# Patient Record
Sex: FEMALE | Race: OTHER | HISPANIC OR LATINO | URBAN - METROPOLITAN AREA
[De-identification: names, ages, dates, MRNs, and addresses within clinical notes are randomized per-mention and may not be internally consistent; named-entity substitution may affect disease eponyms.]

---

## 2017-11-08 ENCOUNTER — EMERGENCY (EMERGENCY)
Facility: HOSPITAL | Age: 32
LOS: 1 days | Discharge: HOME | End: 2017-11-08
Attending: EMERGENCY MEDICINE | Admitting: EMERGENCY MEDICINE
Payer: MEDICAID

## 2017-11-08 VITALS
DIASTOLIC BLOOD PRESSURE: 89 MMHG | OXYGEN SATURATION: 100 % | SYSTOLIC BLOOD PRESSURE: 142 MMHG | HEIGHT: 66 IN | WEIGHT: 259.93 LBS | TEMPERATURE: 98 F | HEART RATE: 95 BPM | RESPIRATION RATE: 16 BRPM

## 2017-11-08 DIAGNOSIS — Z98.89 OTHER SPECIFIED POSTPROCEDURAL STATES: Chronic | ICD-10-CM

## 2017-11-08 DIAGNOSIS — Z98.84 BARIATRIC SURGERY STATUS: Chronic | ICD-10-CM

## 2017-11-08 LAB
ALBUMIN SERPL ELPH-MCNC: 4.7 G/DL — SIGNIFICANT CHANGE UP (ref 3.3–5.2)
ALP SERPL-CCNC: 47 U/L — SIGNIFICANT CHANGE UP (ref 40–120)
ALT FLD-CCNC: 54 U/L — HIGH
ANION GAP SERPL CALC-SCNC: 18 MMOL/L — HIGH (ref 5–17)
APPEARANCE UR: CLEAR — SIGNIFICANT CHANGE UP
AST SERPL-CCNC: 46 U/L — HIGH
BACTERIA # UR AUTO: NEGATIVE — SIGNIFICANT CHANGE UP
BASOPHILS # BLD AUTO: 0 K/UL — SIGNIFICANT CHANGE UP (ref 0–0.2)
BASOPHILS NFR BLD AUTO: 0.1 % — SIGNIFICANT CHANGE UP (ref 0–2)
BILIRUB SERPL-MCNC: 0.3 MG/DL — LOW (ref 0.4–2)
BILIRUB UR-MCNC: NEGATIVE — SIGNIFICANT CHANGE UP
BUN SERPL-MCNC: 8 MG/DL — SIGNIFICANT CHANGE UP (ref 8–20)
CALCIUM SERPL-MCNC: 9.5 MG/DL — SIGNIFICANT CHANGE UP (ref 8.6–10.2)
CHLORIDE SERPL-SCNC: 99 MMOL/L — SIGNIFICANT CHANGE UP (ref 98–107)
CO2 SERPL-SCNC: 23 MMOL/L — SIGNIFICANT CHANGE UP (ref 22–29)
COLOR SPEC: YELLOW — SIGNIFICANT CHANGE UP
CREAT SERPL-MCNC: 0.45 MG/DL — LOW (ref 0.5–1.3)
DIFF PNL FLD: ABNORMAL
EOSINOPHIL # BLD AUTO: 0 K/UL — SIGNIFICANT CHANGE UP (ref 0–0.5)
EOSINOPHIL NFR BLD AUTO: 0 % — SIGNIFICANT CHANGE UP (ref 0–6)
EPI CELLS # UR: SIGNIFICANT CHANGE UP
GLUCOSE SERPL-MCNC: 106 MG/DL — SIGNIFICANT CHANGE UP (ref 70–115)
GLUCOSE UR QL: NEGATIVE MG/DL — SIGNIFICANT CHANGE UP
HCG SERPL-ACNC: <2 MIU/ML — SIGNIFICANT CHANGE UP
HCT VFR BLD CALC: 41.2 % — SIGNIFICANT CHANGE UP (ref 37–47)
HGB BLD-MCNC: 13.8 G/DL — SIGNIFICANT CHANGE UP (ref 12–16)
KETONES UR-MCNC: ABNORMAL
LEUKOCYTE ESTERASE UR-ACNC: ABNORMAL
LIDOCAIN IGE QN: 25 U/L — SIGNIFICANT CHANGE UP (ref 22–51)
LYMPHOCYTES # BLD AUTO: 1.3 K/UL — SIGNIFICANT CHANGE UP (ref 1–4.8)
LYMPHOCYTES # BLD AUTO: 14 % — LOW (ref 20–55)
MCHC RBC-ENTMCNC: 29.6 PG — SIGNIFICANT CHANGE UP (ref 27–31)
MCHC RBC-ENTMCNC: 33.5 G/DL — SIGNIFICANT CHANGE UP (ref 32–36)
MCV RBC AUTO: 88.4 FL — SIGNIFICANT CHANGE UP (ref 81–99)
MONOCYTES # BLD AUTO: 0.2 K/UL — SIGNIFICANT CHANGE UP (ref 0–0.8)
MONOCYTES NFR BLD AUTO: 2.6 % — LOW (ref 3–10)
NEUTROPHILS # BLD AUTO: 7.6 K/UL — SIGNIFICANT CHANGE UP (ref 1.8–8)
NEUTROPHILS NFR BLD AUTO: 83.1 % — HIGH (ref 37–73)
NITRITE UR-MCNC: NEGATIVE — SIGNIFICANT CHANGE UP
PH UR: 6 — SIGNIFICANT CHANGE UP (ref 5–8)
PLATELET # BLD AUTO: 291 K/UL — SIGNIFICANT CHANGE UP (ref 150–400)
POTASSIUM SERPL-MCNC: 3.5 MMOL/L — SIGNIFICANT CHANGE UP (ref 3.5–5.3)
POTASSIUM SERPL-SCNC: 3.5 MMOL/L — SIGNIFICANT CHANGE UP (ref 3.5–5.3)
PROT SERPL-MCNC: 7.8 G/DL — SIGNIFICANT CHANGE UP (ref 6.6–8.7)
PROT UR-MCNC: NEGATIVE MG/DL — SIGNIFICANT CHANGE UP
RBC # BLD: 4.66 M/UL — SIGNIFICANT CHANGE UP (ref 4.4–5.2)
RBC # FLD: 12.5 % — SIGNIFICANT CHANGE UP (ref 11–15.6)
RBC CASTS # UR COMP ASSIST: SIGNIFICANT CHANGE UP /HPF (ref 0–4)
SODIUM SERPL-SCNC: 140 MMOL/L — SIGNIFICANT CHANGE UP (ref 135–145)
SP GR SPEC: 1.01 — SIGNIFICANT CHANGE UP (ref 1.01–1.02)
UROBILINOGEN FLD QL: NEGATIVE MG/DL — SIGNIFICANT CHANGE UP
WBC # BLD: 9.2 K/UL — SIGNIFICANT CHANGE UP (ref 4.8–10.8)
WBC # FLD AUTO: 9.2 K/UL — SIGNIFICANT CHANGE UP (ref 4.8–10.8)
WBC UR QL: SIGNIFICANT CHANGE UP

## 2017-11-08 PROCEDURE — 96374 THER/PROPH/DIAG INJ IV PUSH: CPT | Mod: XU

## 2017-11-08 PROCEDURE — 83690 ASSAY OF LIPASE: CPT

## 2017-11-08 PROCEDURE — 80053 COMPREHEN METABOLIC PANEL: CPT

## 2017-11-08 PROCEDURE — 36415 COLL VENOUS BLD VENIPUNCTURE: CPT

## 2017-11-08 PROCEDURE — 99284 EMERGENCY DEPT VISIT MOD MDM: CPT | Mod: 25

## 2017-11-08 PROCEDURE — 74177 CT ABD & PELVIS W/CONTRAST: CPT

## 2017-11-08 PROCEDURE — 96375 TX/PRO/DX INJ NEW DRUG ADDON: CPT

## 2017-11-08 PROCEDURE — 85027 COMPLETE CBC AUTOMATED: CPT

## 2017-11-08 PROCEDURE — 96376 TX/PRO/DX INJ SAME DRUG ADON: CPT

## 2017-11-08 PROCEDURE — 74177 CT ABD & PELVIS W/CONTRAST: CPT | Mod: 26

## 2017-11-08 PROCEDURE — 84702 CHORIONIC GONADOTROPIN TEST: CPT

## 2017-11-08 PROCEDURE — 81001 URINALYSIS AUTO W/SCOPE: CPT

## 2017-11-08 PROCEDURE — 99285 EMERGENCY DEPT VISIT HI MDM: CPT

## 2017-11-08 RX ORDER — MORPHINE SULFATE 50 MG/1
2 CAPSULE, EXTENDED RELEASE ORAL ONCE
Qty: 0 | Refills: 0 | Status: DISCONTINUED | OUTPATIENT
Start: 2017-11-08 | End: 2017-11-08

## 2017-11-08 RX ORDER — SUCRALFATE 1 G
1 TABLET ORAL
Qty: 60 | Refills: 0
Start: 2017-11-08 | End: 2017-11-23

## 2017-11-08 RX ORDER — PANTOPRAZOLE SODIUM 20 MG/1
1 TABLET, DELAYED RELEASE ORAL
Qty: 15 | Refills: 0
Start: 2017-11-08 | End: 2017-11-23

## 2017-11-08 RX ORDER — SODIUM CHLORIDE 9 MG/ML
1000 INJECTION INTRAMUSCULAR; INTRAVENOUS; SUBCUTANEOUS ONCE
Qty: 0 | Refills: 0 | Status: COMPLETED | OUTPATIENT
Start: 2017-11-08 | End: 2017-11-08

## 2017-11-08 RX ORDER — ONDANSETRON 8 MG/1
1 TABLET, FILM COATED ORAL
Qty: 20 | Refills: 0
Start: 2017-11-08 | End: 2017-11-13

## 2017-11-08 RX ORDER — ONDANSETRON 8 MG/1
4 TABLET, FILM COATED ORAL ONCE
Qty: 0 | Refills: 0 | Status: COMPLETED | OUTPATIENT
Start: 2017-11-08 | End: 2017-11-08

## 2017-11-08 RX ORDER — SUCRALFATE 1 G
1 TABLET ORAL ONCE
Qty: 0 | Refills: 0 | Status: COMPLETED | OUTPATIENT
Start: 2017-11-08 | End: 2017-11-08

## 2017-11-08 RX ORDER — PANTOPRAZOLE SODIUM 20 MG/1
40 TABLET, DELAYED RELEASE ORAL ONCE
Qty: 0 | Refills: 0 | Status: COMPLETED | OUTPATIENT
Start: 2017-11-08 | End: 2017-11-08

## 2017-11-08 RX ORDER — METOCLOPRAMIDE HCL 10 MG
10 TABLET ORAL ONCE
Qty: 0 | Refills: 0 | Status: COMPLETED | OUTPATIENT
Start: 2017-11-08 | End: 2017-11-08

## 2017-11-08 RX ORDER — FAMOTIDINE 10 MG/ML
20 INJECTION INTRAVENOUS ONCE
Qty: 0 | Refills: 0 | Status: COMPLETED | OUTPATIENT
Start: 2017-11-08 | End: 2017-11-08

## 2017-11-08 RX ADMIN — ONDANSETRON 4 MILLIGRAM(S): 8 TABLET, FILM COATED ORAL at 17:47

## 2017-11-08 RX ADMIN — Medication 10 MILLIGRAM(S): at 18:09

## 2017-11-08 RX ADMIN — MORPHINE SULFATE 2 MILLIGRAM(S): 50 CAPSULE, EXTENDED RELEASE ORAL at 18:04

## 2017-11-08 RX ADMIN — ONDANSETRON 4 MILLIGRAM(S): 8 TABLET, FILM COATED ORAL at 21:36

## 2017-11-08 RX ADMIN — Medication 1 GRAM(S): at 17:01

## 2017-11-08 RX ADMIN — SODIUM CHLORIDE 1000 MILLILITER(S): 9 INJECTION INTRAMUSCULAR; INTRAVENOUS; SUBCUTANEOUS at 16:59

## 2017-11-08 RX ADMIN — FAMOTIDINE 20 MILLIGRAM(S): 10 INJECTION INTRAVENOUS at 16:59

## 2017-11-08 RX ADMIN — PANTOPRAZOLE SODIUM 40 MILLIGRAM(S): 20 TABLET, DELAYED RELEASE ORAL at 21:36

## 2017-11-08 RX ADMIN — ONDANSETRON 4 MILLIGRAM(S): 8 TABLET, FILM COATED ORAL at 16:59

## 2017-11-08 RX ADMIN — SODIUM CHLORIDE 2000 MILLILITER(S): 9 INJECTION INTRAMUSCULAR; INTRAVENOUS; SUBCUTANEOUS at 18:08

## 2017-11-08 RX ADMIN — MORPHINE SULFATE 2 MILLIGRAM(S): 50 CAPSULE, EXTENDED RELEASE ORAL at 17:47

## 2017-11-08 NOTE — ED ADULT TRIAGE NOTE - CHIEF COMPLAINT QUOTE
pt presents to ED With epigastric pain since today s/p eating today. pt statse she has gastric sleeve 10/23/17. pt c.o n/v,

## 2017-11-08 NOTE — ED STATDOCS - MEDICAL DECISION MAKING DETAILS
I, Meagan Dominguez, performed the initial face to face bedside interview with this patient regarding history of present illness and determined that the patient should be seen in the main ED.  The history, was documented by the scribe in my presence and I attest to the accuracy of the documentation.

## 2017-11-08 NOTE — ED PROVIDER NOTE - NS ED ATTENDING STATEMENT MOD
Dr. Leon I have personally seen and examined this patient.  I have fully participated in the care of this patient. I have reviewed all pertinent clinical information, including history, physical exam, plan and the Resident’s note and agree except as noted.

## 2017-11-08 NOTE — ED ADULT NURSE REASSESSMENT NOTE - NS ED NURSE REASSESS COMMENT FT1
pt medicated again for nausea ,pt sitting up in stretcher, ambulatory to bathroom when needed without assistance. IV site patent, NS infusing, PO contrast given, awaiting CT.

## 2017-11-08 NOTE — ED PROVIDER NOTE - ATTENDING CONTRIBUTION TO CARE
I personally saw the patient with the resident, and completed the key components of the history and physical exam. I then discussed the management plan with the resident. I agree with the resident's notes as documented in the medical record. I agree with the management plan as reflected in the orders.

## 2017-11-08 NOTE — ED ADULT NURSE NOTE - OBJECTIVE STATEMENT
pt ambulatory into Ed with reports of epigastric pain s.p eating. pt with reports of burning and nausea/vomiting as well. pt is AOX3, denies fevers or diarrhea. even and unlabored resps, no urinary complaints, states hx gastric sleeve.

## 2017-11-08 NOTE — ED PROVIDER NOTE - OBJECTIVE STATEMENT
32 year-old female s/p gastric sleeve on 10/24/17, presents with abdominal pain, nausea and vomiting since eating breakfast this morning. She states that she had been dong well postoperative until this morning, when she developed epigastric abdominal pain after eating eggs. Pain is 10/10, non-radiating, with associated nausea and vomiting. She noted a small amount of bright red blood on toilet paper while wiping.

## 2017-11-08 NOTE — ED PROVIDER NOTE - MEDICAL DECISION MAKING DETAILS
Patient with recent bariatric surgery, now with abdominal pain, N/V.  Will obtain basic labs and abdominal CT to evaluate for leak.

## 2017-11-08 NOTE — ED ADULT NURSE REASSESSMENT NOTE - NS ED NURSE REASSESS COMMENT FT1
pt tolerated PO, drank some chicken broth. no vomiting since arrival and medications. IV patent, ER MD aware. pt advised of her normal CT and lab results. urine sent at this time to lab

## 2017-11-08 NOTE — ED PROVIDER NOTE - PROGRESS NOTE DETAILS
Ct results as noted.  Pt with pain after po challenge.  Case d/w Dr. Bonilla form [facility] [phone] and agrees with d/c on Carafate and Protonix and will arrange for f/u in the office tomorrow-pt understands and needs to call at 9am for appt time.  Pt understands to return for increased pain, vomiting or any other problems

## 2017-11-08 NOTE — ED STATDOCS - PROGRESS NOTE DETAILS
31 y/o F pt presents to the ED with c/o nausea associated with bloody stool and abd pain which onset this morning after she ate scrambled eggs. PShx of gastric sleeve at [facility] on oct 24th. Pt was given oxycodone but couldn't keep the medicine down due to vomiting.  Denies fever. Pt has been frequently passing gas all day. Pt is an occasional drinker. Pt has seen the Surgeon s/p the procedure but she was fine. Will transfer to the main ED.   Surgeon: Dr. Chan 33 y/o F pt presents to the ED with c/o nausea associated with bloody stool and abd pain which onset this morning after she ate scrambled eggs. PShx of gastric sleeve at [facility] on oct 24th. Pt was given oxycodone but couldn't keep the medicine down due to vomiting.  Denies fever. Pt has been frequently passing gas all day. Pt states that she drank 3-4 shots of liquor about 3 days ago and developed nausea/vomiting the day after but then recovered. Abdomen soft, with tenderness across epigastrium but no guarding /rebound. Pt has seen the Surgeon s/p the procedure but she was fine. Will transfer to the main ED.   Surgeon: Dr. Chan

## 2018-02-18 ENCOUNTER — EMERGENCY (EMERGENCY)
Facility: HOSPITAL | Age: 33
LOS: 1 days | Discharge: HOME | End: 2018-02-18
Attending: EMERGENCY MEDICINE | Admitting: EMERGENCY MEDICINE
Payer: COMMERCIAL

## 2018-02-18 VITALS — WEIGHT: 220.02 LBS | HEIGHT: 63 IN

## 2018-02-18 VITALS
TEMPERATURE: 98 F | OXYGEN SATURATION: 98 % | SYSTOLIC BLOOD PRESSURE: 158 MMHG | RESPIRATION RATE: 16 BRPM | DIASTOLIC BLOOD PRESSURE: 116 MMHG | HEART RATE: 92 BPM

## 2018-02-18 DIAGNOSIS — Z98.84 BARIATRIC SURGERY STATUS: Chronic | ICD-10-CM

## 2018-02-18 DIAGNOSIS — Z98.89 OTHER SPECIFIED POSTPROCEDURAL STATES: Chronic | ICD-10-CM

## 2018-02-18 LAB
ALBUMIN SERPL ELPH-MCNC: 4.5 G/DL — SIGNIFICANT CHANGE UP (ref 3.3–5.2)
ALP SERPL-CCNC: 50 U/L — SIGNIFICANT CHANGE UP (ref 40–120)
ALT FLD-CCNC: 15 U/L — SIGNIFICANT CHANGE UP
ANION GAP SERPL CALC-SCNC: 18 MMOL/L — HIGH (ref 5–17)
APPEARANCE UR: ABNORMAL
AST SERPL-CCNC: 20 U/L — SIGNIFICANT CHANGE UP
BACTERIA # UR AUTO: ABNORMAL
BILIRUB SERPL-MCNC: <0.2 MG/DL — LOW (ref 0.4–2)
BILIRUB UR-MCNC: NEGATIVE — SIGNIFICANT CHANGE UP
BUN SERPL-MCNC: 6 MG/DL — LOW (ref 8–20)
CALCIUM SERPL-MCNC: 9.8 MG/DL — SIGNIFICANT CHANGE UP (ref 8.6–10.2)
CHLORIDE SERPL-SCNC: 100 MMOL/L — SIGNIFICANT CHANGE UP (ref 98–107)
CO2 SERPL-SCNC: 24 MMOL/L — SIGNIFICANT CHANGE UP (ref 22–29)
COLOR SPEC: YELLOW — SIGNIFICANT CHANGE UP
CREAT SERPL-MCNC: 0.52 MG/DL — SIGNIFICANT CHANGE UP (ref 0.5–1.3)
DIFF PNL FLD: ABNORMAL
EPI CELLS # UR: ABNORMAL
GLUCOSE SERPL-MCNC: 104 MG/DL — SIGNIFICANT CHANGE UP (ref 70–115)
GLUCOSE UR QL: NEGATIVE MG/DL — SIGNIFICANT CHANGE UP
HCG UR QL: NEGATIVE — SIGNIFICANT CHANGE UP
HCT VFR BLD CALC: 46.6 % — SIGNIFICANT CHANGE UP (ref 37–47)
HGB BLD-MCNC: 15.6 G/DL — SIGNIFICANT CHANGE UP (ref 12–16)
KETONES UR-MCNC: ABNORMAL
LEUKOCYTE ESTERASE UR-ACNC: ABNORMAL
LIDOCAIN IGE QN: 11 U/L — LOW (ref 22–51)
MCHC RBC-ENTMCNC: 31 PG — SIGNIFICANT CHANGE UP (ref 27–31)
MCHC RBC-ENTMCNC: 33.5 G/DL — SIGNIFICANT CHANGE UP (ref 32–36)
MCV RBC AUTO: 92.6 FL — SIGNIFICANT CHANGE UP (ref 81–99)
NITRITE UR-MCNC: NEGATIVE — SIGNIFICANT CHANGE UP
PH UR: 6 — SIGNIFICANT CHANGE UP (ref 5–8)
PLATELET # BLD AUTO: 280 K/UL — SIGNIFICANT CHANGE UP (ref 150–400)
POTASSIUM SERPL-MCNC: 3.7 MMOL/L — SIGNIFICANT CHANGE UP (ref 3.5–5.3)
POTASSIUM SERPL-SCNC: 3.7 MMOL/L — SIGNIFICANT CHANGE UP (ref 3.5–5.3)
PROT SERPL-MCNC: 7.9 G/DL — SIGNIFICANT CHANGE UP (ref 6.6–8.7)
PROT UR-MCNC: 30 MG/DL
RBC # BLD: 5.03 M/UL — SIGNIFICANT CHANGE UP (ref 4.4–5.2)
RBC # FLD: 12.5 % — SIGNIFICANT CHANGE UP (ref 11–15.6)
RBC CASTS # UR COMP ASSIST: ABNORMAL /HPF (ref 0–4)
SODIUM SERPL-SCNC: 142 MMOL/L — SIGNIFICANT CHANGE UP (ref 135–145)
SP GR SPEC: 1.02 — SIGNIFICANT CHANGE UP (ref 1.01–1.02)
UROBILINOGEN FLD QL: NEGATIVE MG/DL — SIGNIFICANT CHANGE UP
WBC # BLD: 5.8 K/UL — SIGNIFICANT CHANGE UP (ref 4.8–10.8)
WBC # FLD AUTO: 5.8 K/UL — SIGNIFICANT CHANGE UP (ref 4.8–10.8)
WBC UR QL: ABNORMAL

## 2018-02-18 PROCEDURE — 80053 COMPREHEN METABOLIC PANEL: CPT

## 2018-02-18 PROCEDURE — 96375 TX/PRO/DX INJ NEW DRUG ADDON: CPT

## 2018-02-18 PROCEDURE — 36415 COLL VENOUS BLD VENIPUNCTURE: CPT

## 2018-02-18 PROCEDURE — 99284 EMERGENCY DEPT VISIT MOD MDM: CPT | Mod: 25

## 2018-02-18 PROCEDURE — 76700 US EXAM ABDOM COMPLETE: CPT

## 2018-02-18 PROCEDURE — 83690 ASSAY OF LIPASE: CPT

## 2018-02-18 PROCEDURE — 81025 URINE PREGNANCY TEST: CPT

## 2018-02-18 PROCEDURE — 76700 US EXAM ABDOM COMPLETE: CPT | Mod: 26

## 2018-02-18 PROCEDURE — 74019 RADEX ABDOMEN 2 VIEWS: CPT

## 2018-02-18 PROCEDURE — 96374 THER/PROPH/DIAG INJ IV PUSH: CPT

## 2018-02-18 PROCEDURE — 99284 EMERGENCY DEPT VISIT MOD MDM: CPT

## 2018-02-18 PROCEDURE — 74019 RADEX ABDOMEN 2 VIEWS: CPT | Mod: 26

## 2018-02-18 PROCEDURE — 81001 URINALYSIS AUTO W/SCOPE: CPT

## 2018-02-18 PROCEDURE — 85027 COMPLETE CBC AUTOMATED: CPT

## 2018-02-18 RX ORDER — PANTOPRAZOLE SODIUM 20 MG/1
40 TABLET, DELAYED RELEASE ORAL ONCE
Qty: 0 | Refills: 0 | Status: COMPLETED | OUTPATIENT
Start: 2018-02-18 | End: 2018-02-18

## 2018-02-18 RX ORDER — ONDANSETRON 8 MG/1
4 TABLET, FILM COATED ORAL ONCE
Qty: 0 | Refills: 0 | Status: COMPLETED | OUTPATIENT
Start: 2018-02-18 | End: 2018-02-18

## 2018-02-18 RX ORDER — PANTOPRAZOLE SODIUM 20 MG/1
1 TABLET, DELAYED RELEASE ORAL
Qty: 7 | Refills: 0
Start: 2018-02-18 | End: 2018-02-24

## 2018-02-18 RX ORDER — KETOROLAC TROMETHAMINE 30 MG/ML
30 SYRINGE (ML) INJECTION ONCE
Qty: 0 | Refills: 0 | Status: DISCONTINUED | OUTPATIENT
Start: 2018-02-18 | End: 2018-02-18

## 2018-02-18 RX ORDER — SODIUM CHLORIDE 9 MG/ML
1000 INJECTION INTRAMUSCULAR; INTRAVENOUS; SUBCUTANEOUS ONCE
Qty: 0 | Refills: 0 | Status: COMPLETED | OUTPATIENT
Start: 2018-02-18 | End: 2018-02-18

## 2018-02-18 RX ADMIN — SODIUM CHLORIDE 1000 MILLILITER(S): 9 INJECTION INTRAMUSCULAR; INTRAVENOUS; SUBCUTANEOUS at 16:36

## 2018-02-18 RX ADMIN — ONDANSETRON 4 MILLIGRAM(S): 8 TABLET, FILM COATED ORAL at 16:41

## 2018-02-18 RX ADMIN — PANTOPRAZOLE SODIUM 40 MILLIGRAM(S): 20 TABLET, DELAYED RELEASE ORAL at 19:15

## 2018-02-18 RX ADMIN — Medication 30 MILLIGRAM(S): at 19:00

## 2018-02-18 RX ADMIN — Medication 30 MILLIGRAM(S): at 16:36

## 2018-02-18 NOTE — ED PROVIDER NOTE - MEDICAL DECISION MAKING DETAILS
epigastric pain will get labs, abdominal xray and abdominal US. If neg pt will follow up with general surgery and GI

## 2018-02-18 NOTE — ED ADULT NURSE NOTE - OBJECTIVE STATEMENT
Patient states that she had bariatric surgery in october and drank a lot of alcohol on valentines day and is now having abdominal pain. 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 Patient c/o N/V, denies any diarrhea.

## 2018-02-18 NOTE — ED PROVIDER NOTE - ATTENDING CONTRIBUTION TO CARE
I, Melita Brunner, performed the initial face to face bedside interview with this patient regarding history of present illness, review of symptoms and relevant past medical, social and family history.  I completed an independent physical examination.  I was the initial provider who evaluated this patient. I have signed out the follow up of any pending tests (i.e. labs, radiological studies) to the ACP.  I have communicated the patient’s plan of care and disposition with the ACP.

## 2018-02-18 NOTE — ED PROVIDER NOTE - OBJECTIVE STATEMENT
31 y/o f with hx of gastric sleeve in oct presenting with nausea, vomiting and epigastric pain since Thursday after binge drinking on Wednesday night. Denies diarrhea, fever, chills 31 y/o f with hx of gastric sleeve in Oct 2017 presenting with nausea, vomiting and 8/10 epigastric pain since Thursday after binge drinking on Wednesday night. Denies diarrhea, fever, chills

## 2018-02-18 NOTE — ED ADULT TRIAGE NOTE - CHIEF COMPLAINT QUOTE
pt presents to ED with abd pain with nausea/vomiting x 3 days. pt has hx of bariatric surgery and drank alcohol 5 days ago.

## 2018-02-18 NOTE — ED ADULT NURSE NOTE - CHPI ED SYMPTOMS NEG
no hematuria/no burning urination/no chills/no dysuria/no blood in stool/no fever/no abdominal distension/no diarrhea

## 2018-11-21 ENCOUNTER — EMERGENCY (EMERGENCY)
Facility: HOSPITAL | Age: 33
LOS: 1 days | Discharge: HOME | End: 2018-11-21
Attending: EMERGENCY MEDICINE
Payer: COMMERCIAL

## 2018-11-21 VITALS — WEIGHT: 203.93 LBS | HEIGHT: 63 IN

## 2018-11-21 VITALS
TEMPERATURE: 98 F | OXYGEN SATURATION: 99 % | RESPIRATION RATE: 20 BRPM | SYSTOLIC BLOOD PRESSURE: 128 MMHG | HEART RATE: 75 BPM | DIASTOLIC BLOOD PRESSURE: 85 MMHG

## 2018-11-21 DIAGNOSIS — Z98.89 OTHER SPECIFIED POSTPROCEDURAL STATES: Chronic | ICD-10-CM

## 2018-11-21 DIAGNOSIS — Z98.84 BARIATRIC SURGERY STATUS: Chronic | ICD-10-CM

## 2018-11-21 PROCEDURE — 99284 EMERGENCY DEPT VISIT MOD MDM: CPT

## 2018-11-21 PROCEDURE — 99283 EMERGENCY DEPT VISIT LOW MDM: CPT | Mod: 25

## 2018-11-21 RX ORDER — EPINEPHRINE 0.3 MG/.3ML
0.3 INJECTION INTRAMUSCULAR; SUBCUTANEOUS
Qty: 1 | Refills: 0
Start: 2018-11-21 | End: 2018-11-21

## 2018-11-21 RX ORDER — FAMOTIDINE 10 MG/ML
20 INJECTION INTRAVENOUS DAILY
Qty: 0 | Refills: 0 | Status: DISCONTINUED | OUTPATIENT
Start: 2018-11-21 | End: 2018-11-26

## 2018-11-21 RX ORDER — FAMOTIDINE 10 MG/ML
1 INJECTION INTRAVENOUS
Qty: 7 | Refills: 0
Start: 2018-11-21 | End: 2018-11-27

## 2018-11-21 RX ORDER — DIPHENHYDRAMINE HCL 50 MG
50 CAPSULE ORAL ONCE
Qty: 0 | Refills: 0 | Status: COMPLETED | OUTPATIENT
Start: 2018-11-21 | End: 2018-11-21

## 2018-11-21 RX ADMIN — Medication 50 MILLIGRAM(S): at 02:20

## 2018-11-21 RX ADMIN — Medication 60 MILLIGRAM(S): at 02:20

## 2018-11-21 RX ADMIN — FAMOTIDINE 20 MILLIGRAM(S): 10 INJECTION INTRAVENOUS at 02:20

## 2018-11-21 NOTE — ED PROVIDER NOTE - OBJECTIVE STATEMENT
Pt is a 33y F complaining of allergic reaction after eating shrimp at 9:30 last night. She states she knows she is allergic but cannot resist and tries to only eat the "good Shrimp". She reports she felt tingling in her lips after she ate the shrimp but it subsided. She went home at 11:30 last night and noticed her whole body was covered Pt is a 33y F complaining of allergic reaction after eating shrimp at 9:30 last night. She states she knows she is allergic but cannot resist and tries to only eat the "good Shrimp". She reports she felt tingling in her lips after she ate the shrimp but it subsided. She went home at 11:30 last night and noticed her whole body was covered in hives. She reports the rash as itchy in nature. She denies taking any antihistamine medication at home. She denies any SOB/angioedema/throat closing sensation. She denies any other complaints. Pt told the importance to not eat shellfish due to her allergy. I stressed the importance of avoiding shellfish and possibility of anaphylaxis and death.

## 2018-11-21 NOTE — ED ADULT NURSE NOTE - OBJECTIVE STATEMENT
assumed care of patient at 0200, alert and oriented x4, in NAD. Pt reports she ate shrimp at 930 last night, shortly after " I felt really leti and my mouth felt funny, then I got hives" Patient reported when she presented to ED her symptoms were nearly resolved. Patient seen and evaluated by MD. Medications given per MD order. No hives or erythema noted on assessment at this time. Safety maintained. pt educated on plan of care, pt able to successfully teach back plan of care to RN, RN will continue to reeducate pt during hospital stay.

## 2018-11-21 NOTE — ED ADULT NURSE REASSESSMENT NOTE - NS ED NURSE REASSESS COMMENT FT1
Patient requesting to leave, stated she feels fine and wants to go home. Discharge instructions reviewed with patient including proper use of EPI pen. Patient educated on use and aware that if administered, she must seek medical attention right away. Patient verbalized understanding of all instructions given. No distress, VSS, safety maintained.

## 2018-11-21 NOTE — ED ADULT NURSE NOTE - NSIMPLEMENTINTERV_GEN_ALL_ED
Implemented All Universal Safety Interventions:  [place] to call system. Call bell, personal items and telephone within reach. Instruct patient to call for assistance. Room bathroom lighting operational. Non-slip footwear when patient is off stretcher. Physically safe environment: no spills, clutter or unnecessary equipment. Stretcher in lowest position, wheels locked, appropriate side rails in place.

## 2018-11-21 NOTE — ED ADULT NURSE NOTE - CHPI ED NUR SYMPTOMS NEG
no wheezing/no difficulty swallowing/no shortness of breath/no swelling of face, tongue/no difficulty breathing/no vomiting/no nausea

## 2018-11-21 NOTE — ED PROVIDER NOTE - ATTENDING CONTRIBUTION TO CARE
I personally saw the patient with the PA, and completed the key components of the history and physical exam. I then discussed the management plan with the PA.   gen in nad resp clear cardiac nomurmur abd soft neuro intact  no signs anpahylaxis prn benadryl advised   return precautions given

## 2018-11-21 NOTE — ED PROVIDER NOTE - MEDICAL DECISION MAKING DETAILS
33y F with known shrimp allergy presenting after eating shrimp last night with hives diffusely over body. Pt told of importance to avoid eating shellfish due to possibility of death. Pt aware and given medication and prescription for home.

## 2018-11-21 NOTE — ED PROVIDER NOTE - CHPI ED SYMPTOMS NEG
no vomiting/no shortness of breath/no swelling of face, tongue/no wheezing/no difficulty breathing/no nausea/no throat itching/no difficulty swallowing/no cough

## 2019-06-29 NOTE — ED PROVIDER NOTE - PROGRESS NOTE DETAILS
stated
Pt feeling better and rash subsiding. Will dc with meds. Pt given epi pen and told to f/u with allergist.

## 2019-10-10 ENCOUNTER — MESSAGE (OUTPATIENT)
Age: 34
End: 2019-10-10

## 2019-10-28 ENCOUNTER — RECORD ABSTRACTING (OUTPATIENT)
Age: 34
End: 2019-10-28

## 2019-10-28 DIAGNOSIS — Z32.02 ENCOUNTER FOR PREGNANCY TEST, RESULT NEGATIVE: ICD-10-CM

## 2019-10-28 DIAGNOSIS — Z87.59 PERSONAL HISTORY OF OTHER COMPLICATIONS OF PREGNANCY, CHILDBIRTH AND THE PUERPERIUM: ICD-10-CM

## 2019-10-28 DIAGNOSIS — Z97.5 PRESENCE OF (INTRAUTERINE) CONTRACEPTIVE DEVICE: ICD-10-CM

## 2019-10-28 DIAGNOSIS — Z20.2 CONTACT WITH AND (SUSPECTED) EXPOSURE TO INFECTIONS WITH A PREDOMINANTLY SEXUAL MODE OF TRANSMISSION: ICD-10-CM

## 2019-10-28 DIAGNOSIS — Z86.19 PERSONAL HISTORY OF OTHER INFECTIOUS AND PARASITIC DISEASES: ICD-10-CM

## 2019-10-28 DIAGNOSIS — N90.89 OTHER SPECIFIED NONINFLAMMATORY DISORDERS OF VULVA AND PERINEUM: ICD-10-CM

## 2019-10-28 DIAGNOSIS — N39.3 STRESS INCONTINENCE (FEMALE) (MALE): ICD-10-CM

## 2019-10-28 DIAGNOSIS — Z72.0 TOBACCO USE: ICD-10-CM

## 2019-10-28 LAB
CYTOLOGY CVX/VAG DOC THIN PREP: NEGATIVE
CYTOLOGY CVX/VAG DOC THIN PREP: NEGATIVE

## 2019-10-29 ENCOUNTER — APPOINTMENT (OUTPATIENT)
Dept: OBGYN | Facility: CLINIC | Age: 34
End: 2019-10-29
Payer: MEDICAID

## 2019-10-29 VITALS
DIASTOLIC BLOOD PRESSURE: 74 MMHG | WEIGHT: 237.25 LBS | HEIGHT: 60.3 IN | BODY MASS INDEX: 45.97 KG/M2 | SYSTOLIC BLOOD PRESSURE: 112 MMHG

## 2019-10-29 DIAGNOSIS — Z82.49 FAMILY HISTORY OF ISCHEMIC HEART DISEASE AND OTHER DISEASES OF THE CIRCULATORY SYSTEM: ICD-10-CM

## 2019-10-29 DIAGNOSIS — Z86.69 PERSONAL HISTORY OF OTHER DISEASES OF THE NERVOUS SYSTEM AND SENSE ORGANS: ICD-10-CM

## 2019-10-29 DIAGNOSIS — Z01.419 ENCOUNTER FOR GYNECOLOGICAL EXAMINATION (GENERAL) (ROUTINE) W/OUT ABNORMAL FINDINGS: ICD-10-CM

## 2019-10-29 DIAGNOSIS — Z80.9 FAMILY HISTORY OF MALIGNANT NEOPLASM, UNSPECIFIED: ICD-10-CM

## 2019-10-29 PROCEDURE — 99395 PREV VISIT EST AGE 18-39: CPT

## 2019-10-29 RX ORDER — VALACYCLOVIR HYDROCHLORIDE 500 MG/1
500 TABLET, FILM COATED ORAL TWICE DAILY
Refills: 0 | Status: DISCONTINUED | COMMUNITY
End: 2019-10-29

## 2019-10-29 NOTE — HISTORY OF PRESENT ILLNESS
[Normal Amount/Duration] : was of a normal amount and duration [Regular Cycle Intervals] : periods have been regular [Frequency: Q ___ days] : menstrual periods occur approximately every [unfilled] days [Menarche Age: ____] : age at menarche was [unfilled] [Sexually Active] : is sexually active [Contraception] : uses contraception [IUD] : has an intrauterine device [Spotting Between  Menses] : no spotting between menses [de-identified] : ParaGard

## 2019-10-31 LAB — HPV HIGH+LOW RISK DNA PNL CVX: NOT DETECTED

## 2019-11-04 LAB — CYTOLOGY CVX/VAG DOC THIN PREP: ABNORMAL

## 2019-11-12 ENCOUNTER — APPOINTMENT (OUTPATIENT)
Dept: OBGYN | Facility: CLINIC | Age: 34
End: 2019-11-12
Payer: MEDICAID

## 2019-11-12 VITALS
WEIGHT: 235 LBS | SYSTOLIC BLOOD PRESSURE: 118 MMHG | BODY MASS INDEX: 46.13 KG/M2 | DIASTOLIC BLOOD PRESSURE: 78 MMHG | HEIGHT: 60 IN

## 2019-11-12 LAB
HCG UR QL: NEGATIVE
QUALITY CONTROL: YES

## 2019-11-12 PROCEDURE — 58301 REMOVE INTRAUTERINE DEVICE: CPT

## 2019-11-12 PROCEDURE — 58300 INSERT INTRAUTERINE DEVICE: CPT

## 2019-11-12 NOTE — PROCEDURE
[IUD Removal] : IUD [Paraguard] : Sonu [Consent Obtained] : consent was obtained prior to the procedure and is detailed in the patient's record [Speculum Placed] : a speculum was placed in the vagina [Strings Visualized] : the IUD strings were visualized [IUD Removed - Forceps] : the strings were grasped with forceps and the IUD was removed [Heavy Vaginal Bleeding] : for heavy vaginal bleeding [IUD Discarded] : discarded [Pelvic Pain] : for pelvic pain [IUD Placement] : intrauterine device (IUD) placement [Prevention of Pregnancy] : prevention of pregnancy [Risks] : risks [Benefits] : benefits [Alternatives] : alternatives [Patient] : patient [Infection] : infection [Pain] : pain [Bleeding] : bleeding [Uterine Perforation] : uterine perforation [Expulsion] : expulsion [Failure] : failure [CONSENT OBTAINED] : written consent was obtained prior to the procedure. [Betadine] : Prepped with Betadine [Neg Pregnancy Test] : a pregnancy test was negative [ParaGard IUD] : The ParaGard IUD was inserted to the fundus of the uterus.  The IUD strings were cut to an appropriate length. [Tenaculum] : a single toothed tenaculum [Easy Passage] : allowed easy passage of a uterine sound without dilation [Tolerated Well] : the patient tolerated the procedure well [No Complications] : there were no complications

## 2019-11-12 NOTE — PROCEDURE
[IUD Removal] : IUD [Paraguard] : Sonu [Speculum Placed] : a speculum was placed in the vagina [Consent Obtained] : consent was obtained prior to the procedure and is detailed in the patient's record [Strings Visualized] : the IUD strings were visualized [IUD Removed - Forceps] : the strings were grasped with forceps and the IUD was removed [IUD Discarded] : discarded [Heavy Vaginal Bleeding] : for heavy vaginal bleeding [Pelvic Pain] : for pelvic pain [IUD Placement] : intrauterine device (IUD) placement [Benefits] : benefits [Risks] : risks [Prevention of Pregnancy] : prevention of pregnancy [Patient] : patient [Alternatives] : alternatives [Infection] : infection [Bleeding] : bleeding [Pain] : pain [Failure] : failure [Uterine Perforation] : uterine perforation [Expulsion] : expulsion [Betadine] : Prepped with Betadine [CONSENT OBTAINED] : written consent was obtained prior to the procedure. [Neg Pregnancy Test] : a pregnancy test was negative [ParaGard IUD] : The ParaGard IUD was inserted to the fundus of the uterus.  The IUD strings were cut to an appropriate length. [Easy Passage] : allowed easy passage of a uterine sound without dilation [Tenaculum] : a single toothed tenaculum [Tolerated Well] : the patient tolerated the procedure well [No Complications] : there were no complications

## 2019-11-12 NOTE — PROCEDURE
[IUD Removal] : IUD [Paraguard] : Sonu [Consent Obtained] : consent was obtained prior to the procedure and is detailed in the patient's record [Speculum Placed] : a speculum was placed in the vagina [IUD Removed - Forceps] : the strings were grasped with forceps and the IUD was removed [Strings Visualized] : the IUD strings were visualized [IUD Discarded] : discarded [Heavy Vaginal Bleeding] : for heavy vaginal bleeding [Pelvic Pain] : for pelvic pain [IUD Placement] : intrauterine device (IUD) placement [Benefits] : benefits [Prevention of Pregnancy] : prevention of pregnancy [Risks] : risks [Alternatives] : alternatives [Patient] : patient [Infection] : infection [Pain] : pain [Bleeding] : bleeding [Expulsion] : expulsion [Failure] : failure [Uterine Perforation] : uterine perforation [CONSENT OBTAINED] : written consent was obtained prior to the procedure. [Neg Pregnancy Test] : a pregnancy test was negative [Betadine] : Prepped with Betadine [ParaGard IUD] : The ParaGard IUD was inserted to the fundus of the uterus.  The IUD strings were cut to an appropriate length. [Tenaculum] : a single toothed tenaculum [Easy Passage] : allowed easy passage of a uterine sound without dilation [Tolerated Well] : the patient tolerated the procedure well [No Complications] : there were no complications

## 2019-12-03 ENCOUNTER — APPOINTMENT (OUTPATIENT)
Dept: ANTEPARTUM | Facility: CLINIC | Age: 34
End: 2019-12-03

## 2020-05-13 ENCOUNTER — EMERGENCY (EMERGENCY)
Facility: HOSPITAL | Age: 35
LOS: 1 days | Discharge: HOME | End: 2020-05-13
Attending: STUDENT IN AN ORGANIZED HEALTH CARE EDUCATION/TRAINING PROGRAM
Payer: SELF-PAY

## 2020-05-13 VITALS
TEMPERATURE: 98 F | SYSTOLIC BLOOD PRESSURE: 120 MMHG | HEART RATE: 115 BPM | OXYGEN SATURATION: 98 % | RESPIRATION RATE: 18 BRPM | DIASTOLIC BLOOD PRESSURE: 87 MMHG

## 2020-05-13 DIAGNOSIS — Z98.84 BARIATRIC SURGERY STATUS: Chronic | ICD-10-CM

## 2020-05-13 DIAGNOSIS — Z98.89 OTHER SPECIFIED POSTPROCEDURAL STATES: Chronic | ICD-10-CM

## 2020-05-13 PROCEDURE — 99282 EMERGENCY DEPT VISIT SF MDM: CPT | Mod: 25

## 2020-05-13 PROCEDURE — 12002 RPR S/N/AX/GEN/TRNK2.6-7.5CM: CPT | Mod: FA

## 2020-05-13 PROCEDURE — 99053 MED SERV 10PM-8AM 24 HR FAC: CPT

## 2020-05-13 PROCEDURE — 99283 EMERGENCY DEPT VISIT LOW MDM: CPT | Mod: 25

## 2020-05-13 PROCEDURE — 12002 RPR S/N/AX/GEN/TRNK2.6-7.5CM: CPT

## 2020-05-13 NOTE — ED PROVIDER NOTE - CLINICAL SUMMARY MEDICAL DECISION MAKING FREE TEXT BOX
PT with stable VS, no acute distress, non toxic appearing, tolerating PO in the ED, pt UTD on vaccination, elan vasc intact, LT thumb RACHID and strength intact when compared to contralateral side against resistance and wo resistance no visible tendon injury, pt educated about high risk of injury for tendon damage and importance of follow up to minimize risk of complications, Pt placed in thumb spica splint to assist in healing and protect possible tendon injury, educated about when to return to the ED if needed. PT verbalizes that he understands all instructions and results.

## 2020-05-13 NOTE — ED PROCEDURE NOTE - CPROC ED POST PROC CARE GUIDE1
Keep the cast/splint/dressing clean and dry./Verbal/written post procedure instructions were given to patient/caregiver./Instructed patient/caregiver to follow-up with primary care physician./Instructed patient/caregiver regarding signs and symptoms of infection./Elevate the injured extremity as instructed.
Keep the cast/splint/dressing clean and dry./Verbal/written post procedure instructions were given to patient/caregiver./Instructed patient/caregiver regarding signs and symptoms of infection./Instructed patient/caregiver to follow-up with primary care physician.

## 2020-05-13 NOTE — ED PROVIDER NOTE - PATIENT PORTAL LINK FT
You can access the FollowMyHealth Patient Portal offered by [facility] by registering at the following website: [email or web address]. By joining [facility]’s FollowMyHealth portal, you will also be able to view your health information using other applications (apps) compatible with our system.

## 2020-05-13 NOTE — ED PROVIDER NOTE - CARE PROVIDER_API CALL
Alexandria Peng)  Orthopedics  [address] [place] [address], [address]  [place] [address]  Phone: [phone]  Fax: [phone]  Follow Up Time:

## 2020-05-13 NOTE — ED PROVIDER NOTE - ADDITIONAL NOTES AND INSTRUCTIONS:
PT was evaluated At [facility] ED and was found to have a condition that warranted time of to rest and heal from WORK/SCHOOL.   Darnell Mena PA-C

## 2020-05-13 NOTE — ED PROVIDER NOTE - NEUROLOGICAL, MLM
Alert and oriented, no focal deficits, no motor or sensory deficits. ulnar radial and medial nerves intact. sensation intact to distal tip of finger.

## 2020-05-13 NOTE — ED PROVIDER NOTE - ATTENDING CONTRIBUTION TO CARE
35 yo female with acute left hand laceration after accidently cutting it with a knife this evening. No safety concerns. I personally saw the patient with the PA, and completed the key components of the history and physical exam. I then discussed the management plan with the PA.

## 2020-05-13 NOTE — ED PROVIDER NOTE - OBJECTIVE STATEMENT
PT with no SPMHx presents to the ED with complaint of Lt hand laceration that occurred Just PTA. Pt states that she was at home when using a kitchen knife she slipped and cute her finger. Pt states that she had a sudden onset of non radiating mild pain that felt dull that was made worse with movement and improved by nothing. Pt states that she cleansed wound at home and came to the ED. Pt dines numbness tingling, weakness, loss of sensation, HA, dizziness, fever, chill.

## 2020-05-13 NOTE — ED PROCEDURE NOTE - PROCEDURE ADDITIONAL DETAILS
Pt with deep wound informed of risk of tendon or nerve injury importance of using brace and follow up to hand sx.

## 2020-05-13 NOTE — ED PROVIDER NOTE - NSFOLLOWUPINSTRUCTIONS_ED_ALL_ED_FT
Patient education: Common finger injuries (The Basics)  View in [place]  Written by the doctors and editors at [facility]  What are common finger injuries?  Common finger injuries include:    ?Finger sprain – A sprain is when a ligament tears or gets stretched too much. Ligaments are tough bands of tissue that connect bones to other bones. Symptoms of a finger sprain can include finger pain, swelling, stiffness, or weakness.    ?Flexor tendon injury – Tendons are strong bands of tissue that connect muscles to bones. In a flexor tendon injury, a tendon on the palm side of the hand is torn or cut. If the cut or tear is near the tip of the finger, it keeps the finger tip from being able to bend. The fingertip stays straight. Doctors sometimes use the term "jersey finger" to describe this injury (picture 1).    ?Extensor tendon injury – In this injury, a tendon on the back of the hand is torn or cut. If the tear or cut is near the tip of the finger, it causes the tip of the finger to stay bent. The finger is unable to straighten. One common type of extensor tendon injury is called "mallet finger" (picture 2). This happens when the finger joint closest to the fingernail gets hurt. Besides making the fingertip bent, mallet finger causes pain and swelling on top of the joint.    Another common type of extensor tendon injury is called a "boutonniere deformity." This happens when the tendon tears and slips out of place. It makes the finger joint nearest the tip of the finger stay straight, and the finger joint in the middle of the finger stay bent (figure 1).    ?Trigger finger – This condition keeps a person's finger from straightening normally. When people try to straighten their finger, the finger "locks" or "catches" in a bent position (picture 3). Trigger finger can also cause pain in the finger or palm. Trigger finger is caused by a problem with a tendon.    Will I need tests?  Probably. Your doctor or nurse will ask about your symptoms and do an exam. They will probably do an X-ray of your finger or hand. They might also do an imaging test such as an ultrasound or CT or MRI scan. Imaging tests create pictures of the inside of the body.    How are finger injuries treated?  Treatment depends on the type of finger injury you have and how severe it is.    If you have a lot of pain or a severe injury, your doctor will prescribe a strong pain medicine. If your injury is mild, your doctor might recommend that you take an over-the-counter medicine for your pain. Over-the-counter medicines include acetaminophen (sample brand name: Tylenol), ibuprofen (sample brand names: Advil, Motrin), and naproxen (sample brand name: Aleve).    Treatments for your finger injury can include one or more of the following:    ?Resting your hand    ?Keeping your hand raised above the level of your heart (when possible)    ?Putting ice on your finger – To reduce swelling, you can put a cold gel pack, bag of ice, or bag of frozen vegetables on the injured area every 1 to 2 hours, for 15 minutes each time. You should put a thin towel between the ice (or other cold object) and your skin. You should use the ice (or other cold object) for at least 6 hours after your injury. Some people find it helpful to ice longer, even up to 2 days after their injury.    ?Wearing an elastic bandage (such as an ACE wrap)    ?Natanael taping – Natanael taping involves taping your injured finger to the finger next to it. This is done most often to treat finger sprains. It is not done to treat tendon injuries or trigger finger.    ?Wearing a splint – Splints are usually used to treat tendon injuries and trigger finger. For some of these injuries, such as mallet finger, people need to wear their splint at all times.    ?Surgery – Some people need surgery to fix a ligament or tendon. Hand surgery is usually done by a specialist called a hand surgeon.    ?Physical therapy – After your injury has healed, your doctor might recommend that you work with a physical therapist (exercise expert). They can teach you exercises to strengthen your fingers and help them move more easily.    How long do finger injuries take to heal?  Finger injuries can take weeks to months to heal, depending on the type of injury.    It also depends on the person. Healthy children usually heal very quickly. Older adults or adults with other medical problems can take much longer to heal.    Can I do anything to improve the healing process?  Yes. It's important to follow all of your doctor's instructions while your finger injury is healing. For example, you might not be allowed to bend or straighten your finger for a certain amount of time. Your doctor might also recommend that you avoid certain activities.    When should I call my doctor or nurse?  Your doctor or nurse will tell you when to call them. In general, you should call them if:    ?You have severe pain, or your pain or swelling gets worse.    ?You have numbness or tingling in your fingers, or your fingers look blue or purple.    ?You bent or straightened your finger, and you weren't supposed to bend or straighten it.      Patient education: Stitches and staples (The Basics)  View in [place]  Written by the doctors and editors at [facility]  What are stitches?  Stitches are a way doctors can close certain types of cuts. A doctor uses a special needle and thread to put in stitches. He or she sews the edges of the cut together and ties knots to hold the stitches in place (figure 1). The term doctors use for stitches is "sutures."    There are 2 main types of stitches:    ?Absorbable – These stitches dissolve over time. They do not need to be taken out.    ?Non-absorbable – These stitches need to be taken out after a certain amount of time. They do not dissolve.    What are staples?  Another way doctors can close cuts is with staples. Staples that go in the body are different from those used on paper. To put staples in, doctors use a special stapler (figure 2). Staples need to be taken out after a certain amount of time, just like non-absorbable stitches.    How do I know if I need stitches or staples?  You will need stitches or staples if your cut is wide, jagged, or goes all the way through your skin. A cut will heal on its own without stitches or staples, but they help a cut heal faster and leave less of a scar.    Minor cuts and scrapes that do not go all the way through the skin do not need stitches. If you get a cut and don't know if you need stitches, check with your doctor or nurse.    What happens when I get stitches or staples?  Before the doctor stitches or staples your cut, he or she will clean out the cut well. He or she will also give you numbing medicine so that you don't feel pain when the stitches or staples go in.    After the doctor stitches or staples your cut, he or she will cover the area with gauze or a bandage.    Why is it important to take care of my stitches or staples?  It's important to take care of your stitches or staples so that your cut heals well and doesn't get infected.    How do I take care of my stitches or staples?  Your doctor or nurse will give you specific instructions, depending on the type of stitches you have and where they are. Staples need the same type of care as non-absorbable stitches.    Here is some general advice you can follow:    ?Keep your stitches or staples dry and covered with a bandage. Non-absorbable stitches and staples need to be kept dry for 1 to 2 days. Absorbable stitches need to be kept dry longer. Your doctor or nurse will tell you exactly how long to keep your stitches dry.    ?Once you no longer need to keep your stitches or staples dry, gently wash them with soap and water whenever you take a shower. Do not put your stitches or staples underwater, such as in a bath, pool, or lake. Getting them too wet can slow down healing and raise your chance of getting an infection.    ?After you wash your stitches or staples, pat them dry and put an antibiotic ointment on them.    ?Cover your stitches or staples with a bandage or gauze, unless your doctor or nurse tells you not to.    ?Avoid activities or sports that could hurt the area of your stitches or staples for 1 to 2 weeks. (Your doctor or nurse will tell you exactly how long to avoid these activities.) If you hurt the same part of your body again, stitches can break, and the cut can open up again.    When should I call the doctor or nurse?  Call your doctor or nurse if:    ?Your stitches break or the cut opens up again.    ?You get a fever.    ?You have redness or swelling around the cut, or pus drains from the cut. It is normal for clear yellow fluid to drain from the cut in the first few days.    When will my stitches or staples be taken out?  The doctor who puts in the stitches or staples will tell you when to see your doctor or nurse to have them taken out. Non-absorbable stitches usually stay in for 5 to 14 days, depending on where they are. Staples usually stay in for 7 to 14 days because they are placed on parts of the body like the scalp, arms, or legs.    Staples need to be taken out with a special staple remover. But doctors' offices don't always have this device. Ask the doctor who puts in your staples for a staple remover. Then bring it to your doctor's office when you have your staples taken out.    What should I do after my stitches or staples are out?  After your stitches or staples are out, you should protect the scar from the sun. Use sunscreen on the area or wear clothes or a hat that covers the scar.    Your doctor or nurse might also recommend that you use certain lotions or creams to help your scar heal.

## 2020-05-13 NOTE — ED PROVIDER NOTE - MUSCULOSKELETAL, MLM
Spine appears normal, range of motion is not limited, no muscle or joint tenderness LT thumb RACHID and strength intact when compared to contralateral side against resistance and wo resistance.

## 2020-10-17 ENCOUNTER — EMERGENCY (EMERGENCY)
Facility: HOSPITAL | Age: 35
LOS: 1 days | Discharge: HOME | End: 2020-10-17
Attending: EMERGENCY MEDICINE
Payer: SELF-PAY

## 2020-10-17 VITALS
DIASTOLIC BLOOD PRESSURE: 86 MMHG | RESPIRATION RATE: 20 BRPM | TEMPERATURE: 99 F | OXYGEN SATURATION: 98 % | HEART RATE: 92 BPM | HEIGHT: 63 IN | WEIGHT: 250 LBS | SYSTOLIC BLOOD PRESSURE: 123 MMHG

## 2020-10-17 DIAGNOSIS — Z98.84 BARIATRIC SURGERY STATUS: Chronic | ICD-10-CM

## 2020-10-17 DIAGNOSIS — Z98.89 OTHER SPECIFIED POSTPROCEDURAL STATES: Chronic | ICD-10-CM

## 2020-10-17 PROCEDURE — 99283 EMERGENCY DEPT VISIT LOW MDM: CPT

## 2020-10-17 RX ORDER — DIPHENHYDRAMINE HCL 50 MG
50 CAPSULE ORAL EVERY 6 HOURS
Refills: 0 | Status: DISCONTINUED | OUTPATIENT
Start: 2020-10-17 | End: 2020-10-22

## 2020-10-17 RX ORDER — FAMOTIDINE 10 MG/ML
20 INJECTION INTRAVENOUS DAILY
Refills: 0 | Status: DISCONTINUED | OUTPATIENT
Start: 2020-10-17 | End: 2020-10-22

## 2020-10-17 RX ORDER — HYDROXYZINE HCL 10 MG
1 TABLET ORAL
Qty: 16 | Refills: 0
Start: 2020-10-17 | End: 2020-10-20

## 2020-10-17 RX ADMIN — FAMOTIDINE 20 MILLIGRAM(S): 10 INJECTION INTRAVENOUS at 15:05

## 2020-10-17 RX ADMIN — Medication 50 MILLIGRAM(S): at 15:05

## 2020-10-17 NOTE — ED PROVIDER NOTE - OBJECTIVE STATEMENT
33 y/o F with no PMH c/o itchy and "burning" rash to body x 6 days.  Patient states that rash started the day after she slept at a hotel.  Denies fever, nausea/vomiting, cough, difficulty swallowing, difficulty breathing, chest pain, tongue or lip swelling.  Patient just completed a medrol dose pack prescribed by [facility], with no relief.

## 2020-10-17 NOTE — ED PROVIDER NOTE - NSFOLLOWUPINSTRUCTIONS_ED_ALL_ED_FT
take medication as prescribed  return to ED immediately if you experience difficulty breathing, difficulty swallowing, fever, or swelling in the lips or tongue

## 2020-10-17 NOTE — ED PROVIDER NOTE - CARE PROVIDER_API CALL
Margarita Ledezma  DERMATOLOGY  [address] [place] [address]  Phone: [phone]  Fax: [phone]  Follow Up Time:

## 2020-10-17 NOTE — ED ADULT TRIAGE NOTE - CHIEF COMPLAINT QUOTE
Pt arrives to ED c/o generalized body rash for one week, pt was placed on tapering steroids without much improvement

## 2020-10-17 NOTE — ED PROVIDER NOTE - PATIENT PORTAL LINK FT
You can access the FollowMyHealth Patient Portal offered by [facility] by registering at the following website: [email or web address]. By joining [employer]’s FollowMyHealth portal, you will also be able to view your health information using other applications (apps) compatible with our system.

## 2020-10-17 NOTE — ED PROVIDER NOTE - ATTENDING CONTRIBUTION TO CARE
HPI: 35yo F with no PMH presenting with pruritic diffuse rash x 1 week. Patient states that after sleeping at hotel in [place] 1 week ago she developed diffuse rash which started on arms and then progressed to trunk and legs. No oral lesions, sore throat. No fevers/chills. No recent travel. Denies new exposures- no new soaps/detergents/meds. States that other person who stayed in hotel with her has no symptoms. Given medrol dosepak without relief which she finished today.     PE:  Gen: NAD, nontoxic appearing  Head: NCAT  HEENT: Oral mucosa moist, normal conjunctiva, no oral lesions  CV: RRR no murmurs, normal perfusion  Resp: CTA b/l, no wheezing, rales, rhonchi, no respiratory distress  GI: Abd Soft NTND  Neuro: No focal neuro deficits  MSK: FROM all 4 extremities, no deformity  Skin: Diffuse maculopapular rash on arms, legs, and trunk, blanching  Psych: Normal affect    MDM: pt with diffuse rash, pruritic, blanching, no systemic symptoms. Likely allergic rash. Already s/p steroids. Will tx with hydroxyzine, dc home with derm f/u. Evelia Buchanan DO     I performed a history and physical exam of the patient and discussed their management with the advanced care provider. I reviewed the advanced care provider's note and agree with the documented findings and plan of care. My medical decision making and objective findings are found above.

## 2020-12-21 PROBLEM — Z01.419 ENCOUNTER FOR GYNECOLOGICAL EXAMINATION: Status: RESOLVED | Noted: 2019-10-29 | Resolved: 2020-12-21

## 2021-02-10 NOTE — ED ADULT NURSE NOTE - NS ED NOTE ABUSE SUSPICION NEGLECT YN
General Sunscreen Counseling: I recommended a broad spectrum sunscreen with a SPF of 30 or higher. Sunscreens should be applied at least 15 minutes prior to expected sun exposure and then every 2 hours after that as long as sun exposure continues. If swimming or exercising sunscreen should be reapplied every 45 minutes to an hour after getting wet or sweating. Sun protective clothing can be used in lieu of sunscreen but must be worn the entire time you are exposed to the sun's rays. Detail Level: Zone No

## 2021-03-15 ENCOUNTER — FORM ENCOUNTER (OUTPATIENT)
Age: 36
End: 2021-03-15

## 2021-04-30 ENCOUNTER — FORM ENCOUNTER (OUTPATIENT)
Age: 36
End: 2021-04-30

## 2021-05-04 ENCOUNTER — FORM ENCOUNTER (OUTPATIENT)
Age: 36
End: 2021-05-04

## 2021-07-22 NOTE — ED ADULT TRIAGE NOTE - TEMPERATURE IN CELSIUS (DEGREES C)
Patient previously followed by Dr Jaimie Irby, reviewed chart, no documentation of Mobic on Rx list  Entered Lisinopril for sign off if you approve  36.7

## 2022-02-22 DIAGNOSIS — Z80.3 FAMILY HISTORY OF MALIGNANT NEOPLASM OF BREAST: ICD-10-CM

## 2022-02-22 DIAGNOSIS — N92.1 EXCESSIVE AND FREQUENT MENSTRUATION WITH IRREGULAR CYCLE: ICD-10-CM

## 2022-02-22 DIAGNOSIS — Z97.5 PRESENCE OF (INTRAUTERINE) CONTRACEPTIVE DEVICE: ICD-10-CM

## 2022-02-22 DIAGNOSIS — Z86.19 PERSONAL HISTORY OF OTHER INFECTIOUS AND PARASITIC DISEASES: ICD-10-CM

## 2022-03-15 ENCOUNTER — APPOINTMENT (OUTPATIENT)
Dept: OBGYN | Facility: CLINIC | Age: 37
End: 2022-03-15

## 2022-05-06 ENCOUNTER — APPOINTMENT (OUTPATIENT)
Dept: ORTHOPEDIC SURGERY | Facility: CLINIC | Age: 37
End: 2022-05-06
Payer: COMMERCIAL

## 2022-05-06 VITALS
DIASTOLIC BLOOD PRESSURE: 83 MMHG | HEIGHT: 60 IN | WEIGHT: 235 LBS | BODY MASS INDEX: 46.13 KG/M2 | SYSTOLIC BLOOD PRESSURE: 139 MMHG | HEART RATE: 89 BPM

## 2022-05-06 PROCEDURE — 99204 OFFICE O/P NEW MOD 45 MIN: CPT

## 2022-05-06 RX ORDER — DICLOFENAC SODIUM 1% 10 MG/G
1 GEL TOPICAL
Qty: 1 | Refills: 0 | Status: ACTIVE | COMMUNITY
Start: 2022-05-06 | End: 1900-01-01

## 2022-05-06 RX ORDER — MELOXICAM 15 MG/1
15 TABLET ORAL DAILY
Qty: 14 | Refills: 0 | Status: ACTIVE | COMMUNITY
Start: 2022-05-06 | End: 1900-01-01

## 2022-05-30 ENCOUNTER — EMERGENCY (EMERGENCY)
Facility: HOSPITAL | Age: 37
LOS: 1 days | Discharge: HOME | End: 2022-05-30
Attending: STUDENT IN AN ORGANIZED HEALTH CARE EDUCATION/TRAINING PROGRAM
Payer: COMMERCIAL

## 2022-05-30 VITALS
RESPIRATION RATE: 18 BRPM | HEART RATE: 92 BPM | TEMPERATURE: 98 F | OXYGEN SATURATION: 99 % | DIASTOLIC BLOOD PRESSURE: 78 MMHG | SYSTOLIC BLOOD PRESSURE: 142 MMHG

## 2022-05-30 VITALS
TEMPERATURE: 98 F | WEIGHT: 259.93 LBS | RESPIRATION RATE: 18 BRPM | HEIGHT: 63 IN | OXYGEN SATURATION: 98 % | SYSTOLIC BLOOD PRESSURE: 147 MMHG | DIASTOLIC BLOOD PRESSURE: 78 MMHG | HEART RATE: 93 BPM

## 2022-05-30 DIAGNOSIS — D25.9 LEIOMYOMA OF UTERUS, UNSPECIFIED: Chronic | ICD-10-CM

## 2022-05-30 DIAGNOSIS — Z98.84 BARIATRIC SURGERY STATUS: Chronic | ICD-10-CM

## 2022-05-30 DIAGNOSIS — Z98.890 OTHER SPECIFIED POSTPROCEDURAL STATES: Chronic | ICD-10-CM

## 2022-05-30 DIAGNOSIS — Z98.89 OTHER SPECIFIED POSTPROCEDURAL STATES: Chronic | ICD-10-CM

## 2022-05-30 LAB
APPEARANCE UR: ABNORMAL
BACTERIA # UR AUTO: ABNORMAL
BILIRUB UR-MCNC: ABNORMAL
COLOR SPEC: YELLOW — SIGNIFICANT CHANGE UP
DIFF PNL FLD: ABNORMAL
EPI CELLS # UR: SIGNIFICANT CHANGE UP
GLUCOSE UR QL: NEGATIVE MG/DL — SIGNIFICANT CHANGE UP
HCG UR QL: NEGATIVE — SIGNIFICANT CHANGE UP
KETONES UR-MCNC: ABNORMAL
LEUKOCYTE ESTERASE UR-ACNC: ABNORMAL
NITRITE UR-MCNC: NEGATIVE — SIGNIFICANT CHANGE UP
PH UR: 6 — SIGNIFICANT CHANGE UP (ref 5–8)
PROT UR-MCNC: 30 MG/DL
RBC CASTS # UR COMP ASSIST: ABNORMAL /HPF (ref 0–4)
SP GR SPEC: 1.02 — SIGNIFICANT CHANGE UP (ref 1.01–1.02)
UROBILINOGEN FLD QL: 1 MG/DL
WBC UR QL: ABNORMAL /HPF (ref 0–5)

## 2022-05-30 PROCEDURE — 76830 TRANSVAGINAL US NON-OB: CPT | Mod: 26

## 2022-05-30 PROCEDURE — 74176 CT ABD & PELVIS W/O CONTRAST: CPT | Mod: 26,MA

## 2022-05-30 PROCEDURE — 99284 EMERGENCY DEPT VISIT MOD MDM: CPT | Mod: 25

## 2022-05-30 PROCEDURE — 81001 URINALYSIS AUTO W/SCOPE: CPT

## 2022-05-30 PROCEDURE — 96372 THER/PROPH/DIAG INJ SC/IM: CPT

## 2022-05-30 PROCEDURE — 74176 CT ABD & PELVIS W/O CONTRAST: CPT | Mod: MA

## 2022-05-30 PROCEDURE — 76856 US EXAM PELVIC COMPLETE: CPT

## 2022-05-30 PROCEDURE — 81025 URINE PREGNANCY TEST: CPT

## 2022-05-30 PROCEDURE — 99285 EMERGENCY DEPT VISIT HI MDM: CPT

## 2022-05-30 PROCEDURE — 76856 US EXAM PELVIC COMPLETE: CPT | Mod: 26,59

## 2022-05-30 PROCEDURE — 76830 TRANSVAGINAL US NON-OB: CPT

## 2022-05-30 PROCEDURE — 87086 URINE CULTURE/COLONY COUNT: CPT

## 2022-05-30 RX ORDER — IBUPROFEN 200 MG
1 TABLET ORAL
Qty: 21 | Refills: 0
Start: 2022-05-30 | End: 2022-06-05

## 2022-05-30 RX ORDER — NITROFURANTOIN MACROCRYSTAL 50 MG
100 CAPSULE ORAL ONCE
Refills: 0 | Status: COMPLETED | OUTPATIENT
Start: 2022-05-30 | End: 2022-05-30

## 2022-05-30 RX ORDER — MORPHINE SULFATE 50 MG/1
4 CAPSULE, EXTENDED RELEASE ORAL ONCE
Refills: 0 | Status: DISCONTINUED | OUTPATIENT
Start: 2022-05-30 | End: 2022-05-30

## 2022-05-30 RX ORDER — KETOROLAC TROMETHAMINE 30 MG/ML
30 SYRINGE (ML) INJECTION ONCE
Refills: 0 | Status: DISCONTINUED | OUTPATIENT
Start: 2022-05-30 | End: 2022-05-30

## 2022-05-30 RX ORDER — NITROFURANTOIN MACROCRYSTAL 50 MG
1 CAPSULE ORAL
Qty: 14 | Refills: 0
Start: 2022-05-30 | End: 2022-06-05

## 2022-05-30 RX ADMIN — Medication 30 MILLIGRAM(S): at 13:25

## 2022-05-30 RX ADMIN — Medication 100 MILLIGRAM(S): at 15:28

## 2022-05-30 RX ADMIN — Medication 30 MILLIGRAM(S): at 13:40

## 2022-05-30 NOTE — ED STATDOCS - NS ED ATTENDING STATEMENT MOD
This was a shared visit with the ERIC. I reviewed and verified the documentation and independently performed the documented:

## 2022-05-30 NOTE — ED STATDOCS - OBJECTIVE STATEMENT
36y female with a PMHx of embolization of fibroids 5 days ago presents to the ED c/o vaginal discomfort onset x5 days. Pt reports she had a embolization of fibroids on Wednesday and since then has felt like her IUD has become displaced.     Pt denies fevers/chills, ha, loc, focal neuro deficits, cp/sob/palp, cough, abd pain/n/v/d, urinary symptoms, recent travel and sick contacts.

## 2022-05-30 NOTE — ED ADULT TRIAGE NOTE - CHIEF COMPLAINT QUOTE
patient states that she had fibroids removed on Weds, complaining of pain to vaginal area states IUD feels like it is out of place

## 2022-05-30 NOTE — ED STATDOCS - PHYSICAL EXAMINATION
Const: Awake, alert and oriented. In no acute distress. Well appearing.  HEENT: NC/AT. Moist mucous membranes.  Eyes: No scleral icterus. EOMI.  Neck:. Soft and supple. Full ROM without pain.  Cardiac: Regular rate and regular rhythm. +S1/S2. Peripheral pulses 2+ and symmetric. No LE edema.  Resp: Speaking in full sentences. No evidence of respiratory distress. No wheezes, rales or rhonchi.  Abd: Soft, non-tender, non-distended. Normal bowel sounds in all 4 quadrants. No guarding or rebound.  GYN: no CMT, no adnexal tenderness. whitish physiological-like discharge, slightly tinged with blood. no active bleeding. Chaperone: Greciadorothy Avila   Back: Spine midline and non-tender. No CVAT.  Skin: No rashes, abrasions or lacerations.  Lymph: No cervical lymphadenopathy.  Neuro: Awake, alert & oriented x 3. Moves all extremities symmetrically.

## 2022-05-30 NOTE — ED ADULT NURSE NOTE - OBJECTIVE STATEMENT
36F, nad, alert and oriented x 3 c/o internal vaginal pain s/p fibroid embolization last Wednesday. Pt reports pain changed 2 days s/p procedure and reports concern for IUD, states "I want to make sure it did not move". Pt reports new vaginal discharge onset today and reports physician examined same and is aware.

## 2022-05-30 NOTE — ED ADULT NURSE NOTE - NSICDXPASTSURGICALHX_GEN_ALL_CORE_FT
PAST SURGICAL HISTORY:  Fibroid, uterine with embolization of same    S/P bariatric surgery gastric sleeve    S/P tracheoplasty     Status post embolization of uterine artery
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
## 2022-05-30 NOTE — ED STATDOCS - CARE PLAN
1 Principal Discharge DX:	Acute UTI  Secondary Diagnosis:	AP (abdominal pain)  Secondary Diagnosis:	Malpositioned IUD
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
## 2022-05-30 NOTE — ED STATDOCS - PATIENT PORTAL LINK FT
You can access the FollowMyHealth Patient Portal offered by [facility] by registering at the following website: [email or web address]. By joining [employer]’s FollowMyHealth portal, you will also be able to view your health information using other applications (apps) compatible with our system.

## 2022-05-30 NOTE — ED STATDOCS - PROGRESS NOTE DETAILS
Pt US performed and IUD not visual and CT performed and IUD visualized with location  noted to be within the myometrium. Pt pain likely due to the uterus fibroids embolization. Pt will followup with her own GYN and continue with high dose NSAID and Macrobid. Pt moved form intake Room. Pt seen and evaluated by intake Physician. HPI, Physical examination performed by intake Physician . Note reviewed and followup examination performed by me consistent with initial assessment. Agrees with intake Physician plan and tests. Pt US performed and IUD not visual and CT performed and IUD visualized with location  noted to be within the myometrium. Pt pain likely due to the uterus fibroids embolization.  Pt was seen by GYN in the ED and Pt states that GYN informed that her pain was due to her Uterine fibroid embolization. GYN Resident explained that malpositioning of IUD occurs often and no intervention is necessary. Recommendations is that Pt can F/U with her own GYN. Pt D/C in stable condition and she will continue with high dose NSAID and Macrobid.

## 2022-05-31 LAB
CULTURE RESULTS: SIGNIFICANT CHANGE UP
SPECIMEN SOURCE: SIGNIFICANT CHANGE UP

## 2022-06-08 ENCOUNTER — APPOINTMENT (OUTPATIENT)
Dept: OBGYN | Facility: CLINIC | Age: 37
End: 2022-06-08

## 2022-06-08 ENCOUNTER — LABORATORY RESULT (OUTPATIENT)
Age: 37
End: 2022-06-08

## 2022-06-08 VITALS
SYSTOLIC BLOOD PRESSURE: 100 MMHG | DIASTOLIC BLOOD PRESSURE: 70 MMHG | HEIGHT: 63 IN | BODY MASS INDEX: 44.3 KG/M2 | WEIGHT: 250 LBS

## 2022-06-08 DIAGNOSIS — Z01.811 ENCOUNTER FOR PREPROCEDURAL RESPIRATORY EXAMINATION: ICD-10-CM

## 2022-06-08 DIAGNOSIS — Z87.898 PERSONAL HISTORY OF OTHER SPECIFIED CONDITIONS: ICD-10-CM

## 2022-06-08 DIAGNOSIS — Z30.433 ENCOUNTER FOR REMOVAL AND REINSERTION OF INTRAUTERINE CONTRACEPTIVE DEVICE: ICD-10-CM

## 2022-06-08 DIAGNOSIS — N76.0 ACUTE VAGINITIS: ICD-10-CM

## 2022-06-08 LAB
BILIRUB UR QL STRIP: NEGATIVE
CLARITY UR: CLEAR
COLLECTION METHOD: NORMAL
GLUCOSE UR-MCNC: NEGATIVE
HCG UR QL: 0.2 EU/DL
HGB UR QL STRIP.AUTO: NORMAL
KETONES UR-MCNC: NEGATIVE
LEUKOCYTE ESTERASE UR QL STRIP: NORMAL
NITRITE UR QL STRIP: NEGATIVE
PH UR STRIP: 7
PROT UR STRIP-MCNC: NEGATIVE
SP GR UR STRIP: 1.01

## 2022-06-13 ENCOUNTER — APPOINTMENT (OUTPATIENT)
Dept: ULTRASOUND IMAGING | Facility: CLINIC | Age: 37
End: 2022-06-13

## 2022-06-13 ENCOUNTER — EMERGENCY (EMERGENCY)
Facility: HOSPITAL | Age: 37
LOS: 1 days | Discharge: HOME | End: 2022-06-13
Attending: EMERGENCY MEDICINE
Payer: COMMERCIAL

## 2022-06-13 VITALS
TEMPERATURE: 98 F | HEIGHT: 63 IN | OXYGEN SATURATION: 98 % | RESPIRATION RATE: 18 BRPM | HEART RATE: 103 BPM | DIASTOLIC BLOOD PRESSURE: 84 MMHG | WEIGHT: 259.93 LBS | SYSTOLIC BLOOD PRESSURE: 149 MMHG

## 2022-06-13 DIAGNOSIS — D25.9 LEIOMYOMA OF UTERUS, UNSPECIFIED: Chronic | ICD-10-CM

## 2022-06-13 DIAGNOSIS — Z98.890 OTHER SPECIFIED POSTPROCEDURAL STATES: Chronic | ICD-10-CM

## 2022-06-13 DIAGNOSIS — Z98.84 BARIATRIC SURGERY STATUS: Chronic | ICD-10-CM

## 2022-06-13 DIAGNOSIS — Z98.89 OTHER SPECIFIED POSTPROCEDURAL STATES: Chronic | ICD-10-CM

## 2022-06-13 LAB
ALBUMIN SERPL ELPH-MCNC: 4.2 G/DL — SIGNIFICANT CHANGE UP (ref 3.3–5.2)
ALP SERPL-CCNC: 70 U/L — SIGNIFICANT CHANGE UP (ref 40–120)
ALT FLD-CCNC: 13 U/L — SIGNIFICANT CHANGE UP
ANION GAP SERPL CALC-SCNC: 13 MMOL/L — SIGNIFICANT CHANGE UP (ref 5–17)
APPEARANCE UR: ABNORMAL
APPEARANCE: ABNORMAL
AST SERPL-CCNC: 18 U/L — SIGNIFICANT CHANGE UP
BACTERIA # UR AUTO: ABNORMAL
BASOPHILS # BLD AUTO: 0.04 K/UL — SIGNIFICANT CHANGE UP (ref 0–0.2)
BASOPHILS NFR BLD AUTO: 0.3 % — SIGNIFICANT CHANGE UP (ref 0–2)
BILIRUB SERPL-MCNC: 0.3 MG/DL — LOW (ref 0.4–2)
BILIRUB UR-MCNC: ABNORMAL
BILIRUBIN URINE: NEGATIVE
BLOOD URINE: NEGATIVE
BUN SERPL-MCNC: 4.8 MG/DL — LOW (ref 8–20)
CALCIUM SERPL-MCNC: 9.4 MG/DL — SIGNIFICANT CHANGE UP (ref 8.6–10.2)
CHLORIDE SERPL-SCNC: 99 MMOL/L — SIGNIFICANT CHANGE UP (ref 98–107)
CO2 SERPL-SCNC: 25 MMOL/L — SIGNIFICANT CHANGE UP (ref 22–29)
COLOR SPEC: YELLOW — SIGNIFICANT CHANGE UP
COLOR: NORMAL
COMMENT - URINE: SIGNIFICANT CHANGE UP
CREAT SERPL-MCNC: 0.56 MG/DL — SIGNIFICANT CHANGE UP (ref 0.5–1.3)
DIFF PNL FLD: ABNORMAL
EGFR: 121 ML/MIN/1.73M2 — SIGNIFICANT CHANGE UP
EOSINOPHIL # BLD AUTO: 0.02 K/UL — SIGNIFICANT CHANGE UP (ref 0–0.5)
EOSINOPHIL NFR BLD AUTO: 0.2 % — SIGNIFICANT CHANGE UP (ref 0–6)
EPI CELLS # UR: SIGNIFICANT CHANGE UP
GLUCOSE QUALITATIVE U: NEGATIVE
GLUCOSE SERPL-MCNC: 95 MG/DL — SIGNIFICANT CHANGE UP (ref 70–99)
GLUCOSE UR QL: NEGATIVE — SIGNIFICANT CHANGE UP
HCG UR QL: NEGATIVE — SIGNIFICANT CHANGE UP
HCT VFR BLD CALC: 36.9 % — SIGNIFICANT CHANGE UP (ref 34.5–45)
HGB BLD-MCNC: 12.1 G/DL — SIGNIFICANT CHANGE UP (ref 11.5–15.5)
IMM GRANULOCYTES NFR BLD AUTO: 0.5 % — SIGNIFICANT CHANGE UP (ref 0–1.5)
KETONES UR-MCNC: ABNORMAL
KETONES URINE: NEGATIVE
LEUKOCYTE ESTERASE UR-ACNC: ABNORMAL
LEUKOCYTE ESTERASE URINE: NEGATIVE
LYMPHOCYTES # BLD AUTO: 1.28 K/UL — SIGNIFICANT CHANGE UP (ref 1–3.3)
LYMPHOCYTES # BLD AUTO: 9.7 % — LOW (ref 13–44)
MCHC RBC-ENTMCNC: 29.7 PG — SIGNIFICANT CHANGE UP (ref 27–34)
MCHC RBC-ENTMCNC: 32.8 GM/DL — SIGNIFICANT CHANGE UP (ref 32–36)
MCV RBC AUTO: 90.4 FL — SIGNIFICANT CHANGE UP (ref 80–100)
MONOCYTES # BLD AUTO: 0.66 K/UL — SIGNIFICANT CHANGE UP (ref 0–0.9)
MONOCYTES NFR BLD AUTO: 5 % — SIGNIFICANT CHANGE UP (ref 2–14)
NEUTROPHILS # BLD AUTO: 11.12 K/UL — HIGH (ref 1.8–7.4)
NEUTROPHILS NFR BLD AUTO: 84.3 % — HIGH (ref 43–77)
NITRITE UR-MCNC: POSITIVE
NITRITE URINE: POSITIVE
PH UR: 5 — SIGNIFICANT CHANGE UP (ref 5–8)
PH URINE: 7
PLATELET # BLD AUTO: 346 K/UL — SIGNIFICANT CHANGE UP (ref 150–400)
POTASSIUM SERPL-MCNC: 4.1 MMOL/L — SIGNIFICANT CHANGE UP (ref 3.5–5.3)
POTASSIUM SERPL-SCNC: 4.1 MMOL/L — SIGNIFICANT CHANGE UP (ref 3.5–5.3)
PROT SERPL-MCNC: 8.5 G/DL — SIGNIFICANT CHANGE UP (ref 6.6–8.7)
PROT UR-MCNC: NEGATIVE — SIGNIFICANT CHANGE UP
PROTEIN URINE: NEGATIVE
RBC # BLD: 4.08 M/UL — SIGNIFICANT CHANGE UP (ref 3.8–5.2)
RBC # FLD: 11.9 % — SIGNIFICANT CHANGE UP (ref 10.3–14.5)
RBC CASTS # UR COMP ASSIST: ABNORMAL /HPF (ref 0–4)
SODIUM SERPL-SCNC: 137 MMOL/L — SIGNIFICANT CHANGE UP (ref 135–145)
SP GR SPEC: 1.02 — SIGNIFICANT CHANGE UP (ref 1.01–1.02)
SPECIFIC GRAVITY URINE: 1.02
UROBILINOGEN FLD QL: 4
UROBILINOGEN URINE: NORMAL
WBC # BLD: 13.18 K/UL — HIGH (ref 3.8–10.5)
WBC # FLD AUTO: 13.18 K/UL — HIGH (ref 3.8–10.5)
WBC UR QL: ABNORMAL /HPF (ref 0–5)

## 2022-06-13 PROCEDURE — 81001 URINALYSIS AUTO W/SCOPE: CPT

## 2022-06-13 PROCEDURE — 85025 COMPLETE CBC W/AUTO DIFF WBC: CPT

## 2022-06-13 PROCEDURE — 96374 THER/PROPH/DIAG INJ IV PUSH: CPT

## 2022-06-13 PROCEDURE — 81025 URINE PREGNANCY TEST: CPT

## 2022-06-13 PROCEDURE — 80053 COMPREHEN METABOLIC PANEL: CPT

## 2022-06-13 PROCEDURE — 76856 US EXAM PELVIC COMPLETE: CPT | Mod: 26

## 2022-06-13 PROCEDURE — 87491 CHLMYD TRACH DNA AMP PROBE: CPT

## 2022-06-13 PROCEDURE — 36415 COLL VENOUS BLD VENIPUNCTURE: CPT

## 2022-06-13 PROCEDURE — 99284 EMERGENCY DEPT VISIT MOD MDM: CPT

## 2022-06-13 PROCEDURE — 76856 US EXAM PELVIC COMPLETE: CPT

## 2022-06-13 PROCEDURE — 99284 EMERGENCY DEPT VISIT MOD MDM: CPT | Mod: 25

## 2022-06-13 PROCEDURE — 96375 TX/PRO/DX INJ NEW DRUG ADDON: CPT

## 2022-06-13 RX ORDER — KETOROLAC TROMETHAMINE 30 MG/ML
30 SYRINGE (ML) INJECTION ONCE
Refills: 0 | Status: DISCONTINUED | OUTPATIENT
Start: 2022-06-13 | End: 2022-06-13

## 2022-06-13 RX ORDER — AZTREONAM 2 G
1 VIAL (EA) INJECTION
Qty: 14 | Refills: 0
Start: 2022-06-13 | End: 2022-06-19

## 2022-06-13 RX ORDER — CEFTRIAXONE 500 MG/1
1000 INJECTION, POWDER, FOR SOLUTION INTRAMUSCULAR; INTRAVENOUS ONCE
Refills: 0 | Status: COMPLETED | OUTPATIENT
Start: 2022-06-13 | End: 2022-06-13

## 2022-06-13 RX ORDER — OXYCODONE AND ACETAMINOPHEN 5; 325 MG/1; MG/1
1 TABLET ORAL ONCE
Refills: 0 | Status: DISCONTINUED | OUTPATIENT
Start: 2022-06-13 | End: 2022-06-13

## 2022-06-13 RX ORDER — ACETAMINOPHEN 500 MG
650 TABLET ORAL ONCE
Refills: 0 | Status: COMPLETED | OUTPATIENT
Start: 2022-06-13 | End: 2022-06-13

## 2022-06-13 RX ADMIN — CEFTRIAXONE 100 MILLIGRAM(S): 500 INJECTION, POWDER, FOR SOLUTION INTRAMUSCULAR; INTRAVENOUS at 11:01

## 2022-06-13 RX ADMIN — Medication 100 MILLIGRAM(S): at 11:51

## 2022-06-13 RX ADMIN — Medication 30 MILLIGRAM(S): at 11:45

## 2022-06-13 RX ADMIN — Medication 30 MILLIGRAM(S): at 11:01

## 2022-06-13 RX ADMIN — OXYCODONE AND ACETAMINOPHEN 1 TABLET(S): 5; 325 TABLET ORAL at 08:46

## 2022-06-13 RX ADMIN — OXYCODONE AND ACETAMINOPHEN 1 TABLET(S): 5; 325 TABLET ORAL at 09:00

## 2022-06-13 NOTE — CONSULT NOTE ADULT - ASSESSMENT
36y  (LMP 22)  presents with suprapubic pain that has been on and off for 2 weeks.    A/P:  - afebrile  - UA positive for suspicious for UTI  - Recommend repeat Urine Culture.  - Recommend Bactrim DS BID for 7 days outpatient  - Patient should followup outpatient if desired IUD removal, nonemergent at this time    d/w Dr. Heath (service attending on call)

## 2022-06-13 NOTE — ED ADULT NURSE NOTE - OBJECTIVE STATEMENT
pt c/o lower pelvic pain radiating to b/l lower back. pt states pain feels like it's "behind pelvic bone" and radiates around to lower hips on back. pt is moaning in pain, awaiting pain meds order. pt states has difficulty urinating & urine is maria eugenia in color. pt states feeling like she has a UTI, but states has never had one in past. pt currently on abx from procedure. IV in place, labs sent. pt shows no s/s of acute distress at this time. safety precautions maintained.

## 2022-06-13 NOTE — ED ADULT NURSE NOTE - CINV DISCH TEACH PARTICIP
I will SWITCH the dose or number of times a day I take the medications listed below when I get home from the hospital:  None Patient

## 2022-06-13 NOTE — ED ADULT NURSE REASSESSMENT NOTE - NS ED NURSE REASSESS COMMENT FT1
pt medicated for pain with some relief. pt currently receiving antibiotics. respirations even and unlabored. pt shows no s/s of acute distress at this time. safety precautions maintained.

## 2022-06-13 NOTE — ED PROVIDER NOTE - MDM ORDERS SUBMITTED SELECTION
Central Prior Authorization Team   Phone: [phone]      PA Initiation    Medication: NOVOLOG FLEXPEN  Insurance Company: Comment:  Stephanie Dumont. [phone] Fax [phone]  Pharmacy Filling the Rx: [facility] [address] [place] [address] - [address] [place] [address] [address] [facility]  Filling Pharmacy Phone: [phone]  Filling Pharmacy Fax: [phone]  Start Date: 5/8/2018      
Fax received from [employer] in [place] on [address].  Patient plan does not cover NOVOLOG FLEXPEN.  Please send alternate RX or advise if PA should be initiated.  
Flexpen denied.     Now the formularies are:    Denial Rational:   Insurance covers Humalog Kwikpen, Apidra Solostar and Admelog Solostar as alternatives.   
Let's try PA  
Ordered.   
PRIOR AUTHORIZATION DENIED    Medication: NOVOLOG FLEXPEN - Denied    Denial Date: 5/10/2018    Denial Rational:   Insurance covers Humalog Kwikpen, Apidra Solostar and Admelog Solostar as alternatives.     Appeal Information:        
Prior Authorization Retail Medication Request    Medication/Dose: NOVOLOG FLEXPEN  ICD code (if different than what is on RX):  E11.21, z79.4  Previously Tried and Failed:  None   Rationale:  Has been on novolog for since 2015    Insurance Name:  [facility] OUT OF STATE  Insurance ID:  [id number]       Pharmacy Information (if different than what is on RX)  Name:  [facility]   Phone:  [phone]    
Pt informed of messages below.  
Labs/Medications

## 2022-06-13 NOTE — ED ADULT NURSE NOTE - NSIMPLEMENTINTERV_GEN_ALL_ED
Implemented All Universal Safety Interventions:  [place] to call system. Call bell, personal items and telephone within reach. Instruct patient to call for assistance. Room bathroom lighting operational. Non-slip footwear when patient is off stretcher. Physically safe environment: no spills, clutter or unnecessary equipment. Stretcher in lowest position, wheels locked, appropriate side rails in place.

## 2022-06-13 NOTE — ED PROVIDER NOTE - PROGRESS NOTE DETAILS
PT evaluated by intake physician. HPI/PE/ROS as noted above. Will follow up plan per intake physician. GYN consult called, plan pending consult GYN Aniya, states Pt can follow up out pt, rx bactrim bid x 7 days

## 2022-06-13 NOTE — ED ADULT TRIAGE NOTE - CHIEF COMPLAINT QUOTE
pt c/o severe low pelvic pain, ? UTI, had a procedure last week, was here last week given ABX but the pain is severe  A&Ox3, resp wnl, crying

## 2022-06-13 NOTE — ED PROVIDER NOTE - PATIENT PORTAL LINK FT
You can access the FollowMyHealth Patient Portal offered by [facility] by registering at the following website: [email or web address]. By joining "[employer]’s FollowMyHealth portal, you will also be able to view your health information using other applications (apps) compatible with our system.
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
## 2022-06-14 LAB
C TRACH RRNA SPEC QL NAA+PROBE: SIGNIFICANT CHANGE UP
SPECIMEN SOURCE: SIGNIFICANT CHANGE UP

## 2022-06-15 NOTE — ED ADULT TRIAGE NOTE - NS ED TRIAGE AVPU SCALE
Head,  normocephalic,  atraumatic,  Face,  Face within normal limits,  Ears,  External ears within normal limits,  Nose/Nasopharynx,  External nose  normal appearance,  nares patent,  no nasal discharge,  Mouth and Throat,  Oral cavity appearance normal,  Breath odor normal,  Lips,  Appearance normal Alert-The patient is alert, awake and responds to voice. The patient is oriented to time, place, and person. The triage nurse is able to obtain subjective information.

## 2022-06-28 ENCOUNTER — LABORATORY RESULT (OUTPATIENT)
Age: 37
End: 2022-06-28

## 2022-06-28 ENCOUNTER — APPOINTMENT (OUTPATIENT)
Dept: OBGYN | Facility: CLINIC | Age: 37
End: 2022-06-28
Payer: COMMERCIAL

## 2022-06-28 VITALS
DIASTOLIC BLOOD PRESSURE: 76 MMHG | SYSTOLIC BLOOD PRESSURE: 124 MMHG | WEIGHT: 250 LBS | HEIGHT: 63 IN | BODY MASS INDEX: 44.3 KG/M2

## 2022-06-28 DIAGNOSIS — R39.9 UNSPECIFIED SYMPTOMS AND SIGNS INVOLVING THE GENITOURINARY SYSTEM: ICD-10-CM

## 2022-06-28 DIAGNOSIS — Z01.419 ENCOUNTER FOR GYNECOLOGICAL EXAMINATION (GENERAL) (ROUTINE) W/OUT ABNORMAL FINDINGS: ICD-10-CM

## 2022-06-28 DIAGNOSIS — Z86.19 PERSONAL HISTORY OF OTHER INFECTIOUS AND PARASITIC DISEASES: ICD-10-CM

## 2022-06-28 LAB
BILIRUB UR QL STRIP: NEGATIVE
CLARITY UR: CLEAR
COLLECTION METHOD: NORMAL
GLUCOSE UR-MCNC: NEGATIVE
HCG UR QL: 0.2 EU/DL
HGB UR QL STRIP.AUTO: NORMAL
KETONES UR-MCNC: NEGATIVE
LEUKOCYTE ESTERASE UR QL STRIP: NORMAL
NITRITE UR QL STRIP: NEGATIVE
PH UR STRIP: 7
PROT UR STRIP-MCNC: NEGATIVE
SP GR UR STRIP: 1

## 2022-06-28 PROCEDURE — 81002 URINALYSIS NONAUTO W/O SCOPE: CPT

## 2022-06-28 PROCEDURE — 99395 PREV VISIT EST AGE 18-39: CPT

## 2022-06-28 NOTE — PLAN
[FreeTextEntry1] : 36-year-old status post uterine artery embolization for fibroid uterus had abnormal bleeding immediately postoperative but this has resolved she also had a UTI which was treated in the emergency room UA today showed positive leukocytes we will send repeat culture, she will have a transvaginal sonogram 1 week post next.  She will take multivitamins vitamin D3 3 she has already had a mammogram breast self-exam taught we will return in 12 months.

## 2022-06-28 NOTE — PHYSICAL EXAM
[Chaperone Present] : A chaperone was present in the examining room during all aspects of the physical examination [Appropriately responsive] : appropriately responsive [Alert] : alert [No Acute Distress] : no acute distress [No Lymphadenopathy] : no lymphadenopathy [Regular Rate Rhythm] : regular rate rhythm [No Murmurs] : no murmurs [Clear to Auscultation B/L] : clear to auscultation bilaterally [Soft] : soft [Non-tender] : non-tender [Non-distended] : non-distended [No HSM] : No HSM [No Lesions] : no lesions [No Mass] : no mass [Oriented x3] : oriented x3 [Examination Of The Breasts] : a normal appearance [No Masses] : no breast masses were palpable [Labia Minora] : normal [Labia Majora] : normal [Old Blood] : old blood was present in the vagina [Normal] : normal [Anteversion] : anteverted [Uterine Adnexae] : normal [Declined] : Patient declined rectal exam [FreeTextEntry1] : Exam [FreeTextEntry6] : Pendulous, symmetrical [FreeTextEntry8] : Enlarged uterus 6 weeks anteverted, no masses nontender adnexa

## 2022-06-28 NOTE — HISTORY OF PRESENT ILLNESS
[HIV test declined] : HIV test declined [Syphilis test declined] : Syphilis test declined [Gonorrhea test declined] : Gonorrhea test declined [Chlamydia test declined] : Chlamydia test declined [Trichomonas test declined] : Trichomonas test declined [Hepatitis B test declined] : Hepatitis B test declined [Hepatitis C test declined] : Hepatitis C test declined [N] : Patient does not use contraception [Y] : Positive pregnancy history [N/A] : pregnancy not applicable [Normal Amount/Duration] :  normal amount and duration [Regular Cycle Intervals] : periods have been regular [Currently Active] : currently active [Men] : men [Vaginal] : vaginal [No] : No [TextBox_4] : PT HERE FOR ANNUAL EXAM.  [Mammogramdate] : 05/01/21 [TextBox_19] : BR1 [PapSmeardate] : 03/16/21 [TextBox_31] : NORMAL [TextBox_78] : YEARS AGO [LMPDate] : 05/30/22 [MensesFreq] : 28 [[facility]] : 1 [PGHxTotal] : 3 [PGHxAbortions] : 1 [[facility] Centeriving] : 1 [PGxEctopic] : 1 [TextBox_6] : 5/30/22 [TextBox_9] : 12 [TextBox_13] : 28 [TextBox_15] : 5 [FreeTextEntry1] : 05/30/22

## 2022-06-29 ENCOUNTER — LABORATORY RESULT (OUTPATIENT)
Age: 37
End: 2022-06-29

## 2022-06-29 LAB
APPEARANCE: CLEAR
BILIRUBIN URINE: NEGATIVE
BLOOD URINE: NORMAL
C TRACH RRNA SPEC QL NAA+PROBE: NOT DETECTED
COLOR: NORMAL
GLUCOSE QUALITATIVE U: NEGATIVE
HPV HIGH+LOW RISK DNA PNL CVX: NOT DETECTED
KETONES URINE: NEGATIVE
LEUKOCYTE ESTERASE URINE: ABNORMAL
N GONORRHOEA RRNA SPEC QL NAA+PROBE: NOT DETECTED
NITRITE URINE: NEGATIVE
PH URINE: 6.5
PROTEIN URINE: NEGATIVE
SOURCE TP AMPLIFICATION: NORMAL
SPECIFIC GRAVITY URINE: 1.01
UROBILINOGEN URINE: NORMAL

## 2022-07-05 LAB
C TRACH RRNA SPEC QL NAA+PROBE: NOT DETECTED
N GONORRHOEA RRNA SPEC QL NAA+PROBE: NOT DETECTED
SOURCE TP AMPLIFICATION: NORMAL

## 2022-07-06 LAB — HPV HIGH+LOW RISK DNA PNL CVX: NOT DETECTED

## 2022-07-18 ENCOUNTER — APPOINTMENT (OUTPATIENT)
Dept: ULTRASOUND IMAGING | Facility: CLINIC | Age: 37
End: 2022-07-18

## 2022-07-18 ENCOUNTER — OUTPATIENT (OUTPATIENT)
Dept: OUTPATIENT SERVICES | Facility: HOSPITAL | Age: 37
LOS: 1 days | End: 2022-07-18

## 2022-07-18 DIAGNOSIS — Z98.890 OTHER SPECIFIED POSTPROCEDURAL STATES: Chronic | ICD-10-CM

## 2022-07-18 DIAGNOSIS — Z98.84 BARIATRIC SURGERY STATUS: Chronic | ICD-10-CM

## 2022-07-18 DIAGNOSIS — S61.012D LACERATION WITHOUT FOREIGN BODY OF LEFT THUMB WITHOUT DAMAGE TO NAIL, SUBSEQUENT ENCOUNTER: ICD-10-CM

## 2022-07-18 DIAGNOSIS — D25.9 LEIOMYOMA OF UTERUS, UNSPECIFIED: Chronic | ICD-10-CM

## 2022-07-18 DIAGNOSIS — Z98.89 OTHER SPECIFIED POSTPROCEDURAL STATES: Chronic | ICD-10-CM

## 2022-07-18 PROCEDURE — 76882 US LMTD JT/FCL EVL NVASC XTR: CPT | Mod: 26,LT

## 2022-08-01 ENCOUNTER — OUTPATIENT (OUTPATIENT)
Dept: OUTPATIENT SERVICES | Facility: HOSPITAL | Age: 37
LOS: 1 days | End: 2022-08-01

## 2022-08-01 ENCOUNTER — APPOINTMENT (OUTPATIENT)
Dept: ULTRASOUND IMAGING | Facility: CLINIC | Age: 37
End: 2022-08-01

## 2022-08-01 DIAGNOSIS — Z00.8 ENCOUNTER FOR OTHER GENERAL EXAMINATION: ICD-10-CM

## 2022-08-01 DIAGNOSIS — Z98.890 OTHER SPECIFIED POSTPROCEDURAL STATES: Chronic | ICD-10-CM

## 2022-08-01 DIAGNOSIS — Z98.84 BARIATRIC SURGERY STATUS: Chronic | ICD-10-CM

## 2022-08-01 DIAGNOSIS — D25.9 LEIOMYOMA OF UTERUS, UNSPECIFIED: Chronic | ICD-10-CM

## 2022-08-01 DIAGNOSIS — Z98.89 OTHER SPECIFIED POSTPROCEDURAL STATES: Chronic | ICD-10-CM

## 2022-08-01 PROCEDURE — 76830 TRANSVAGINAL US NON-OB: CPT | Mod: 26

## 2022-08-10 ENCOUNTER — APPOINTMENT (OUTPATIENT)
Dept: ORTHOPEDIC SURGERY | Facility: CLINIC | Age: 37
End: 2022-08-10

## 2022-08-10 VITALS
HEIGHT: 63 IN | BODY MASS INDEX: 44.3 KG/M2 | DIASTOLIC BLOOD PRESSURE: 84 MMHG | SYSTOLIC BLOOD PRESSURE: 120 MMHG | WEIGHT: 250 LBS | HEART RATE: 80 BPM

## 2022-08-11 ENCOUNTER — APPOINTMENT (OUTPATIENT)
Dept: ORTHOPEDIC SURGERY | Facility: CLINIC | Age: 37
End: 2022-08-11

## 2022-08-11 PROCEDURE — 99442: CPT

## 2022-08-12 ENCOUNTER — APPOINTMENT (OUTPATIENT)
Dept: OBGYN | Facility: CLINIC | Age: 37
End: 2022-08-12

## 2022-08-12 VITALS
SYSTOLIC BLOOD PRESSURE: 122 MMHG | WEIGHT: 250 LBS | DIASTOLIC BLOOD PRESSURE: 80 MMHG | HEIGHT: 63 IN | BODY MASS INDEX: 44.3 KG/M2

## 2022-08-12 PROCEDURE — 58301 REMOVE INTRAUTERINE DEVICE: CPT

## 2022-08-12 NOTE — PROCEDURE
[IUD Placement] : intrauterine device (IUD) placement [Neg Pregnancy Test] : negative pregnancy test [Ibuprofen ___ mg] : ibuprofen [unfilled] ~Umg [ParaGard IUD] : ParaGard IUD [IUD Removal] : intrauterine device (IUD) removal [ IUD] :  IUD [IUD Discarded] : IUD discarded [Risks] : risks [Benefits] : benefits [Alternatives] : alternatives [Patient] : patient [Speculum Placed] : speculum placed [IUD Removed - Forceps] : IUD removed - forceps [Tolerated Well] : Patient tolerated the procedure well [No Complications] : no complications [Heavy Vaginal Bleeding] : for heavy vaginal bleeding [Pelvic Pain] : for pelvic pain [___ Week(s)] : in [unfilled] week(s) [de-identified] : [id number] [de-identified] : 04/2027 [de-identified] : 08/2032 [de-identified] : History of trichomonas recently treated we will repeat trichomoniasis DNA probe prior to inserting new IUD [de-identified] : Follow-up DNA probe [de-identified] : Return during menses for IUD insertion

## 2022-08-12 NOTE — PROCEDURE
[IUD Placement] : intrauterine device (IUD) placement [Neg Pregnancy Test] : negative pregnancy test [Ibuprofen ___ mg] : ibuprofen [unfilled] ~Umg [ParaGard IUD] : ParaGard IUD [IUD Removal] : intrauterine device (IUD) removal [ IUD] :  IUD [IUD Discarded] : IUD discarded [Risks] : risks [Benefits] : benefits [Alternatives] : alternatives [Patient] : patient [Speculum Placed] : speculum placed [IUD Removed - Forceps] : IUD removed - forceps [Tolerated Well] : Patient tolerated the procedure well [No Complications] : no complications [Heavy Vaginal Bleeding] : for heavy vaginal bleeding [Pelvic Pain] : for pelvic pain [___ Week(s)] : in [unfilled] week(s) [de-identified] : [id number] [de-identified] : 04/2027 [de-identified] : 08/2032 [de-identified] : History of trichomonas recently treated we will repeat trichomoniasis DNA probe prior to inserting new IUD [de-identified] : Follow-up DNA probe [de-identified] : Return during menses for IUD insertion

## 2022-08-12 NOTE — HISTORY OF PRESENT ILLNESS
[N] : Patient does not use contraception [Y] : Positive pregnancy history [N/A] : pregnancy not applicable [Normal Amount/Duration] :  normal amount and duration [Regular Cycle Intervals] : periods have been regular [Currently Active] : currently active [Men] : men [Vaginal] : vaginal [No] : No [FreeTextEntry1] : 35 y/o female here for consultation  [TextBox_4] : PT HERE FOR CONSULT  [MensesFreq] : 28 [MensesLength] : 5 [PGHxTotal] : 3 [[facility]] : 1 [PGHxAbortions] : 1 [[facility]iving] : 1 [PGxEctopic] : 1 [TextBox_9] : 12 [TextBox_13] : 28 [TextBox_15] : 5

## 2022-08-12 NOTE — HISTORY OF PRESENT ILLNESS
[N] : Patient does not use contraception [Y] : Positive pregnancy history [N/A] : pregnancy not applicable [Normal Amount/Duration] :  normal amount and duration [Regular Cycle Intervals] : periods have been regular [Currently Active] : currently active [Men] : men [Vaginal] : vaginal [No] : No [FreeTextEntry1] : 35 y/o female here for consultation  [TextBox_4] : PT HERE FOR CONSULT  [MensesFreq] : 28 [MensesLength] : 5 [PGHxTotal] : 3 [[facility]ul[facility]] : 1 [PGHxAbortions] : 1 [[facility] Centeriving] : 1 [PGxEctopic] : 1 [TextBox_9] : 12 [TextBox_13] : 28 [TextBox_15] : 5

## 2022-08-17 LAB
C TRACH RRNA SPEC QL NAA+PROBE: NOT DETECTED
N GONORRHOEA RRNA SPEC QL NAA+PROBE: NOT DETECTED
SOURCE AMPLIFICATION: NORMAL

## 2022-08-19 NOTE — ED ADULT TRIAGE NOTE - HEART RATE (BEATS/MIN)
103 Bed in lowest position, wheels locked, appropriate side rails in place/Call bell, personal items and telephone in reach/Instruct patient to call for assistance before getting out of bed or chair/Non-slip footwear when patient is out of bed/[place] to call system/Physically safe environment - no spills, clutter or unnecessary equipment/Purposeful Proactive Rounding/Room/bathroom lighting operational, light cord in reach

## 2022-09-01 ENCOUNTER — APPOINTMENT (OUTPATIENT)
Dept: ORTHOPEDIC SURGERY | Facility: CLINIC | Age: 37
End: 2022-09-01

## 2022-09-01 VITALS
HEIGHT: 63 IN | DIASTOLIC BLOOD PRESSURE: 84 MMHG | BODY MASS INDEX: 44.3 KG/M2 | WEIGHT: 250 LBS | SYSTOLIC BLOOD PRESSURE: 124 MMHG | HEART RATE: 87 BPM

## 2022-09-01 DIAGNOSIS — S64.32XD: ICD-10-CM

## 2022-09-01 DIAGNOSIS — S61.012D LACERATION W/OUT FOREIGN BODY OF LEFT THUMB W/OUT DAMAGE TO NAIL, SUBSEQUENT ENCOUNTER: ICD-10-CM

## 2022-09-01 PROCEDURE — 99213 OFFICE O/P EST LOW 20 MIN: CPT

## 2022-09-23 ENCOUNTER — APPOINTMENT (OUTPATIENT)
Dept: OBGYN | Facility: CLINIC | Age: 37
End: 2022-09-23

## 2022-09-23 VITALS
SYSTOLIC BLOOD PRESSURE: 122 MMHG | HEIGHT: 63 IN | BODY MASS INDEX: 44.3 KG/M2 | WEIGHT: 250 LBS | DIASTOLIC BLOOD PRESSURE: 80 MMHG

## 2022-09-23 PROCEDURE — 58300 INSERT INTRAUTERINE DEVICE: CPT

## 2022-09-23 NOTE — PROCEDURE
[IUD Placement] : intrauterine device (IUD) placement [Prevention of Pregnancy] : prevention of pregnancy [Abnormal Uterine Bleeding] : abnormal uterine bleeding [Neg GC/Chlamydia] : negative GC/Chlamydia [Ibuprofen ___ mg] : ibuprofen [unfilled] ~Umg [ParaGard IUD] : ParaGard IUD [Time out performed] : Pre-procedure time out performed.  Patient's name, date of birth and procedure confirmed. [Consent Obtained] : Consent obtained [Risks] : risks [Benefits] : benefits [Alternatives] : alternatives [Patient] : patient [Infection] : infection [Bleeding] : bleeding [Pain] : pain [Expulsion] : expulsion [Failure] : failure [Uterine Perforation] : uterine perforation [Neg Pregnancy Test] : negative pregnancy test [Betadine] : Betadine [Tenaculum] : Tenaculum [Sounded to ___ cm] : sounded to [unfilled] ~Ucm [US Guidance] : US Guidance [Tolerated Well] : Patient tolerated the procedure well [No Complications] : No complications [None] : None [Motrin/Ibuprofen] : Motrin/Ibuprofen [de-identified] : Informed consent obtained [LMPDate] : 09/21/22 [de-identified] : [id number] [de-identified] : 04/2027 [de-identified] : 09/23/2032 [de-identified] : Instructions given Motrin for pain

## 2022-10-19 ENCOUNTER — APPOINTMENT (OUTPATIENT)
Dept: ORTHOPEDIC SURGERY | Facility: CLINIC | Age: 37
End: 2022-10-19
Payer: COMMERCIAL

## 2022-10-19 VITALS
HEIGHT: 63 IN | SYSTOLIC BLOOD PRESSURE: 155 MMHG | WEIGHT: 250 LBS | HEART RATE: 87 BPM | DIASTOLIC BLOOD PRESSURE: 112 MMHG | BODY MASS INDEX: 44.3 KG/M2

## 2022-10-19 DIAGNOSIS — R20.2 PARESTHESIA OF SKIN: ICD-10-CM

## 2022-10-19 DIAGNOSIS — M79.642 PAIN IN LEFT HAND: ICD-10-CM

## 2022-10-19 PROCEDURE — 99214 OFFICE O/P EST MOD 30 MIN: CPT

## 2022-10-19 PROCEDURE — 73130 X-RAY EXAM OF HAND: CPT | Mod: 50

## 2022-10-19 NOTE — REVIEW OF SYSTEMS
[Right] : right [Joint Pain] : joint pain [Negative] : Allergic/Immunologic [FreeTextEntry9] : numbness

## 2022-10-19 NOTE — REASON FOR VISIT
[Initial Visit] : an initial visit for [FreeTextEntry2] : left thumb injured 2 summers ago pt state she lacerated her thumb

## 2022-10-19 NOTE — HISTORY OF PRESENT ILLNESS
[FreeTextEntry1] : Ms. Nuñez is a pleasant 36-year-old female who is seeing me with a 2-year complaint of left thumb pain.  She was cooking 2 years ago and accidentally lacerated her left thumb.  Since then she has had developing worsening neuroma like pain with numbness throughout the ulnar distribution volarly at the thumb.  She is unable to sleep or perform activities that require any gripping due to pain

## 2022-10-19 NOTE — PHYSICAL EXAM
[de-identified] : General Exam:\par \par [The patient is alert and oriented, is well appearing, and in no apparent distress]\par [Cervical spine mobility is full in all directions]\par [There are no apparent skin lesions, ulcers, or masses]\par [Inspection of the extremities shows no signs of skin changes or muscle asymmetry]\par \par Examination of her left thumb reveals a well-healed scar overlying the ulnar aspect of the MP joint slightly volar and close to the commissure.\par She is exquisitely tender at the level of the laceration site with a positive Tinel\par Her two-point discrimination on the radial side is 2-3 and on the ulnar side it is greater than 10 [de-identified] : [4] views of bilateral hands and wrists were obtained today in my office and were seen by me and discussed with the patient. \par These show findings consistent with grossly preserved radiocarpal joint spaces and no obvious bony lesions throughout the fingers and hands\par

## 2022-10-19 NOTE — ASSESSMENT
[FreeTextEntry1] : ASSESSMENT:\par \par The patient comes in today with chronic complaints of left thumb laceration exacerbated by a neuroma formation and significant nighttime symptoms as well as inability perform activities that require  and pinch strength.  This is significantly aggravating her\par [I have diagnosed the patient today with a new diagnosis.]\par \par [This is likely to diminish bodily function for the extremity.] \par \par [I was able to review other tests or imaging results] \par \par [I was able to personally view outside imaging for my own interpretation.\par \par She was adequately and thoroughly informed of my assessment of their current condition(s). \par \par We had a thorough discussion regarding her laceration.  This is resulted in a neuroma over the ulnar digital branch of the thumb.  At this point she has elected to proceed with surgical management.  We talked about allograft to bridge the gap between the cut ends.  I talked about removal of the neuroma.  We talked about recurrence.  We talked about chronic pain.\par \par Surgical Consent Discussion:\par \par I explained the risks and benefits of surgical intervention to the patient. The risks include, but are not limited to: pain, infection, swelling, stiffness, injury to underlying neurovascular structures, scar sensitivity, incomplete resolution of symptoms, the possibility of the need for future surgery and finally the need to comply with a post-operative occupational therapy protocol. The patient understands, agrees and informed consent was obtained. The patient’s surgery will be scheduled in the near future.\par \par \par

## 2022-10-19 NOTE — DISCUSSION/SUMMARY
[FreeTextEntry1] : 1.  She has elected to proceed with a left thumb exploration of laceration and ulnar digital nerve repair or reconstruction with allograft\par \par #2 I would like to see her back 2 weeks from surgery for clinical assessment

## 2022-10-27 ENCOUNTER — OUTPATIENT (OUTPATIENT)
Dept: OUTPATIENT SERVICES | Facility: HOSPITAL | Age: 37
LOS: 1 days | End: 2022-10-27
Payer: COMMERCIAL

## 2022-10-27 VITALS
SYSTOLIC BLOOD PRESSURE: 138 MMHG | WEIGHT: 255.96 LBS | RESPIRATION RATE: 16 BRPM | OXYGEN SATURATION: 97 % | HEIGHT: 63 IN | HEART RATE: 98 BPM | DIASTOLIC BLOOD PRESSURE: 90 MMHG

## 2022-10-27 DIAGNOSIS — Z98.89 OTHER SPECIFIED POSTPROCEDURAL STATES: Chronic | ICD-10-CM

## 2022-10-27 DIAGNOSIS — D25.9 LEIOMYOMA OF UTERUS, UNSPECIFIED: Chronic | ICD-10-CM

## 2022-10-27 DIAGNOSIS — Z29.9 ENCOUNTER FOR PROPHYLACTIC MEASURES, UNSPECIFIED: ICD-10-CM

## 2022-10-27 DIAGNOSIS — Z01.818 ENCOUNTER FOR OTHER PREPROCEDURAL EXAMINATION: ICD-10-CM

## 2022-10-27 DIAGNOSIS — Z13.89 ENCOUNTER FOR SCREENING FOR OTHER DISORDER: ICD-10-CM

## 2022-10-27 DIAGNOSIS — Z98.84 BARIATRIC SURGERY STATUS: Chronic | ICD-10-CM

## 2022-10-27 DIAGNOSIS — S61.012S: ICD-10-CM

## 2022-10-27 DIAGNOSIS — Z98.890 OTHER SPECIFIED POSTPROCEDURAL STATES: Chronic | ICD-10-CM

## 2022-10-27 LAB
A1C WITH ESTIMATED AVERAGE GLUCOSE RESULT: 5.6 % — SIGNIFICANT CHANGE UP (ref 4–5.6)
ANION GAP SERPL CALC-SCNC: 13 MMOL/L — SIGNIFICANT CHANGE UP (ref 5–17)
APTT BLD: 32.6 SEC — SIGNIFICANT CHANGE UP (ref 27.5–35.5)
BASOPHILS # BLD AUTO: 0.02 K/UL — SIGNIFICANT CHANGE UP (ref 0–0.2)
BASOPHILS NFR BLD AUTO: 0.3 % — SIGNIFICANT CHANGE UP (ref 0–2)
BUN SERPL-MCNC: 11.8 MG/DL — SIGNIFICANT CHANGE UP (ref 8–20)
CALCIUM SERPL-MCNC: 9.5 MG/DL — SIGNIFICANT CHANGE UP (ref 8.4–10.5)
CHLORIDE SERPL-SCNC: 102 MMOL/L — SIGNIFICANT CHANGE UP (ref 96–108)
CO2 SERPL-SCNC: 25 MMOL/L — SIGNIFICANT CHANGE UP (ref 22–29)
CREAT SERPL-MCNC: 0.6 MG/DL — SIGNIFICANT CHANGE UP (ref 0.5–1.3)
EGFR: 118 ML/MIN/1.73M2 — SIGNIFICANT CHANGE UP
EOSINOPHIL # BLD AUTO: 0.06 K/UL — SIGNIFICANT CHANGE UP (ref 0–0.5)
EOSINOPHIL NFR BLD AUTO: 1 % — SIGNIFICANT CHANGE UP (ref 0–6)
ESTIMATED AVERAGE GLUCOSE: 114 MG/DL — SIGNIFICANT CHANGE UP (ref 68–114)
GLUCOSE SERPL-MCNC: 87 MG/DL — SIGNIFICANT CHANGE UP (ref 70–99)
HCT VFR BLD CALC: 40.5 % — SIGNIFICANT CHANGE UP (ref 34.5–45)
HGB BLD-MCNC: 13.6 G/DL — SIGNIFICANT CHANGE UP (ref 11.5–15.5)
IMM GRANULOCYTES NFR BLD AUTO: 0.2 % — SIGNIFICANT CHANGE UP (ref 0–0.9)
INR BLD: 1.02 RATIO — SIGNIFICANT CHANGE UP (ref 0.88–1.16)
LYMPHOCYTES # BLD AUTO: 1.76 K/UL — SIGNIFICANT CHANGE UP (ref 1–3.3)
LYMPHOCYTES # BLD AUTO: 28.8 % — SIGNIFICANT CHANGE UP (ref 13–44)
MCHC RBC-ENTMCNC: 29.4 PG — SIGNIFICANT CHANGE UP (ref 27–34)
MCHC RBC-ENTMCNC: 33.6 GM/DL — SIGNIFICANT CHANGE UP (ref 32–36)
MCV RBC AUTO: 87.7 FL — SIGNIFICANT CHANGE UP (ref 80–100)
MONOCYTES # BLD AUTO: 0.4 K/UL — SIGNIFICANT CHANGE UP (ref 0–0.9)
MONOCYTES NFR BLD AUTO: 6.5 % — SIGNIFICANT CHANGE UP (ref 2–14)
NEUTROPHILS # BLD AUTO: 3.86 K/UL — SIGNIFICANT CHANGE UP (ref 1.8–7.4)
NEUTROPHILS NFR BLD AUTO: 63.2 % — SIGNIFICANT CHANGE UP (ref 43–77)
PLATELET # BLD AUTO: 302 K/UL — SIGNIFICANT CHANGE UP (ref 150–400)
POTASSIUM SERPL-MCNC: 4.3 MMOL/L — SIGNIFICANT CHANGE UP (ref 3.5–5.3)
POTASSIUM SERPL-SCNC: 4.3 MMOL/L — SIGNIFICANT CHANGE UP (ref 3.5–5.3)
PROTHROM AB SERPL-ACNC: 11.8 SEC — SIGNIFICANT CHANGE UP (ref 10.5–13.4)
RBC # BLD: 4.62 M/UL — SIGNIFICANT CHANGE UP (ref 3.8–5.2)
RBC # FLD: 12.1 % — SIGNIFICANT CHANGE UP (ref 10.3–14.5)
SODIUM SERPL-SCNC: 140 MMOL/L — SIGNIFICANT CHANGE UP (ref 135–145)
WBC # BLD: 6.11 K/UL — SIGNIFICANT CHANGE UP (ref 3.8–10.5)
WBC # FLD AUTO: 6.11 K/UL — SIGNIFICANT CHANGE UP (ref 3.8–10.5)

## 2022-10-27 PROCEDURE — 36415 COLL VENOUS BLD VENIPUNCTURE: CPT

## 2022-10-27 PROCEDURE — 80048 BASIC METABOLIC PNL TOTAL CA: CPT

## 2022-10-27 PROCEDURE — G0463: CPT

## 2022-10-27 PROCEDURE — 93010 ELECTROCARDIOGRAM REPORT: CPT

## 2022-10-27 PROCEDURE — 85730 THROMBOPLASTIN TIME PARTIAL: CPT

## 2022-10-27 PROCEDURE — 83036 HEMOGLOBIN GLYCOSYLATED A1C: CPT

## 2022-10-27 PROCEDURE — 85025 COMPLETE CBC W/AUTO DIFF WBC: CPT

## 2022-10-27 PROCEDURE — 93005 ELECTROCARDIOGRAM TRACING: CPT

## 2022-10-27 PROCEDURE — 85610 PROTHROMBIN TIME: CPT
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
## 2022-10-27 NOTE — H&P PST ADULT - PROBLEM SELECTOR PROBLEM 2
Laceration of left thumb, foreign body presence unspecified, nail damage status unspecified, sequela

## 2022-10-27 NOTE — H&P PST ADULT - ASSESSMENT
Pt is a 36-year-old female seen today pre-op for left thumb deep laceration exploration, left thumb ulnar digital nerve neurolysis and repair. Pt report traumatic left thumb happened a about  when she was cooking, she accidentally lacerated her left thumb. Pt report  left thumb pain since then with numbness and tingling. Pt report pain is aggravated with activities that require any gripping. Pt PMH includes RAJESH(CPAP Machine not used), Morbidly obese, elevated blood pressure currently not on any medication. Seen today for a scheduled procedure on 22 with Dr. Jefferson. Surgery protocol reviewed with Pt today. Pt instructed on COVID PCR requirement prior to this procedure  CAPRINI VTE 2.0 SCORE [CLOT updated 2019]    AGE RELATED RISK FACTORS                                                       MOBILITY RELATED FACTORS  [ ] Age 41-60 years                                            (1 Point)                    [ ] Bed rest                                                        (1 Point)  [ ] Age: 61-74 years                                           (2 Points)                  [ ] Plaster cast                                                   (2 Points)  [ ] Age= 75 years                                              (3 Points)                    [ ] Bed bound for more than 72 hours                 (2 Points)    DISEASE RELATED RISK FACTORS                                               GENDER SPECIFIC FACTORS  [ ] Edema in the lower extremities                       (1 Point)              [ ] Pregnancy                                                     (1 Point)  [ ] Varicose veins                                               (1 Point)                     [ ] Post-partum < 6 weeks                                   (1 Point)             [x ] BMI > 25 Kg/m2                                            (1 Point)                     [ ] Hormonal therapy  or oral contraception          (1 Point)                 [ ] Sepsis (in the previous month)                        (1 Point)               [ ] History of pregnancy complications                 (1 point)  [ ] Pneumonia or serious lung disease                                               [ ] Unexplained or recurrent                     (1 Point)           (in the previous month)                               (1 Point)  [ ] Abnormal pulmonary function test                     (1 Point)                 SURGERY RELATED RISK FACTORS  [ ] Acute myocardial infarction                              (1 Point)               [ ]  Section                                             (1 Point)  [ ] Congestive heart failure (in the previous month)  (1 Point)      [ ] Minor surgery                                                  (1 Point)   [ ] Inflammatory bowel disease                             (1 Point)               [ ] Arthroscopic surgery                                        (2 Points)  [ ] Central venous access                                      (2 Points)                [ x] General surgery lasting more than 45 minutes (2 points)  [ ] Malignancy- Present or previous                   (2 Points)                [ ] Elective arthroplasty                                         (5 points)    [ ] Stroke (in the previous month)                          (5 Points)                                                                                                                                                           HEMATOLOGY RELATED FACTORS                                                 TRAUMA RELATED RISK FACTORS  [ ] Prior episodes of VTE                                     (3 Points)                [ ] Fracture of the hip, pelvis, or leg                       (5 Points)  [ ] Positive family history for VTE                         (3 Points)             [ ] Acute spinal cord injury (in the previous month)  (5 Points)  [ ] Prothrombin 27024 A                                     (3 Points)               [ ] Paralysis  (less than 1 month)                             (5 Points)  [ ] Factor V Leiden                                             (3 Points)                  [ ] Multiple Trauma within 1 month                        (5 Points)  [ ] Lupus anticoagulants                                     (3 Points)                                                           [ ] Anticardiolipin antibodies                               (3 Points)                                                       [ ] High homocysteine in the blood                      (3 Points)                                             [ ] Other congenital or acquired thrombophilia      (3 Points)                                                [ ] Heparin induced thrombocytopenia                  (3 Points)                                     Total Score [    3      ]   OPIOID RISK TOOL    MARILEE EACH BOX THAT APPLIES AND ADD TOTALS AT THE END    FAMILY HISTORY OF SUBSTANCE ABUSE                 FEMALE         MALE                                                Alcohol                             [  ]1 pt          [  ]3pts                                               Illegal Durgs                     [  ]2 pts        [  ]3pts                                               Rx Drugs                           [  ]4 pts        [  ]4 pts    PERSONAL HISTORY OF SUBSTANCE ABUSE                                                                                          Alcohol                             [  ]3 pts       [  ]3 pts                                               Illegal Drugs                     [x  ]4 pts        [  ]4 pts                                               Rx Drugs                           [  ]5 pts        [  ]5 pts    AGE BETWEEN 16-45 YEARS                                      [  ]1 pt         [  ]1 pt    HISTORY OF PREADOLESCENT   SEXUAL ABUSE                                                             [  ]3 pts        [  ]0pts    PSYCHOLOGICAL DISEASE                     ADD, OCD, Bipolar, Schizophrenia        [  ]2 pts         [  ]2 pts                      Depression                                               [  ]1 pt           [  ]1 pt           SCORING TOTAL   (add numbers and type here)              (**4*)                                     A score of 3 or lower indicated LOW risk for future opioid abuse  A score of 4 to 7 indicated moderate risk for future opioid abuse  A score of 8 or higher indicates a high risk for opioid abuse

## 2022-10-27 NOTE — H&P PST ADULT - NSICDXFAMILYHX_GEN_ALL_CORE_FT
FAMILY HISTORY:  Father  Still living? Yes, Estimated age: Age Unknown  FHx: hypertension, Age at diagnosis: Age Unknown    Mother  Still living? Unknown  FHx: hypertension, Age at diagnosis: Age Unknown

## 2022-10-27 NOTE — H&P PST ADULT - NSICDXPASTSURGICALHX_GEN_ALL_CORE_FT
PAST SURGICAL HISTORY:  Fibroid, uterine with embolization of same    S/P bariatric surgery gastric sleeve 2017    S/P tracheoplasty     Status post embolization of uterine artery

## 2022-10-27 NOTE — H&P PST ADULT - HIV STATUS
"Sharp pain in left side of top of head.  Started after changing a tire in the heat. States he drank too much alcohol yesterday Threw up upon arrival to ER.  Has a "light headache" now on left top of head.   "
Negative/Unknown

## 2022-10-27 NOTE — H&P PST ADULT - HISTORY OF PRESENT ILLNESS
Pt is a 36-year-old female seen today pre-op for left thumb deep laceration exploration, left thumb ulnar digital nerve neurolysis and repair. Pt report traumatic left thumb happened a about  when she was cooking, she accidentally lacerated her left thumb. Pt report  left thumb pain since then with numbness and tingling. Pt report pain is aggravated with activities that require any gripping. Pt PMH includes RAJESH(CPAP Machine not used), Morbidly obese, elevated blood pressure currently not on any medication. Seen today for a scheduled procedure on 11/4/22 with Dr. Jefferson

## 2022-10-27 NOTE — H&P PST ADULT - PROBLEM SELECTOR PLAN 2
Left thumb deep laceration exploration, left thumb ulnar digital nerve neurolysis and repair. follow-up with PCP for medical clearance

## 2022-11-02 PROBLEM — E66.01 MORBID (SEVERE) OBESITY DUE TO EXCESS CALORIES: Chronic | Status: ACTIVE | Noted: 2022-10-27

## 2022-11-02 PROBLEM — R20.0 ANESTHESIA OF SKIN: Chronic | Status: ACTIVE | Noted: 2022-10-27

## 2022-11-02 PROBLEM — R03.0 ELEVATED BLOOD-PRESSURE READING, WITHOUT DIAGNOSIS OF HYPERTENSION: Chronic | Status: ACTIVE | Noted: 2022-10-27

## 2022-11-02 PROBLEM — S61.012S: Chronic | Status: ACTIVE | Noted: 2022-10-27

## 2022-11-03 ENCOUNTER — TRANSCRIPTION ENCOUNTER (OUTPATIENT)
Age: 37
End: 2022-11-03

## 2022-11-04 ENCOUNTER — TRANSCRIPTION ENCOUNTER (OUTPATIENT)
Age: 37
End: 2022-11-04

## 2022-11-04 ENCOUNTER — OUTPATIENT (OUTPATIENT)
Dept: OUTPATIENT SERVICES | Facility: HOSPITAL | Age: 37
LOS: 1 days | End: 2022-11-04
Payer: COMMERCIAL

## 2022-11-04 ENCOUNTER — APPOINTMENT (OUTPATIENT)
Dept: ORTHOPEDIC SURGERY | Facility: HOSPITAL | Age: 37
End: 2022-11-04

## 2022-11-04 VITALS
SYSTOLIC BLOOD PRESSURE: 108 MMHG | DIASTOLIC BLOOD PRESSURE: 70 MMHG | TEMPERATURE: 97 F | OXYGEN SATURATION: 99 % | HEART RATE: 76 BPM | RESPIRATION RATE: 20 BRPM

## 2022-11-04 VITALS
DIASTOLIC BLOOD PRESSURE: 89 MMHG | OXYGEN SATURATION: 98 % | HEART RATE: 77 BPM | WEIGHT: 259.93 LBS | RESPIRATION RATE: 16 BRPM | HEIGHT: 63 IN | TEMPERATURE: 98 F | SYSTOLIC BLOOD PRESSURE: 137 MMHG

## 2022-11-04 DIAGNOSIS — Z98.84 BARIATRIC SURGERY STATUS: Chronic | ICD-10-CM

## 2022-11-04 DIAGNOSIS — R20.0 ANESTHESIA OF SKIN: ICD-10-CM

## 2022-11-04 DIAGNOSIS — S61.012S: ICD-10-CM

## 2022-11-04 DIAGNOSIS — D25.9 LEIOMYOMA OF UTERUS, UNSPECIFIED: Chronic | ICD-10-CM

## 2022-11-04 DIAGNOSIS — Z98.89 OTHER SPECIFIED POSTPROCEDURAL STATES: Chronic | ICD-10-CM

## 2022-11-04 DIAGNOSIS — Z98.890 OTHER SPECIFIED POSTPROCEDURAL STATES: Chronic | ICD-10-CM

## 2022-11-04 LAB — GLUCOSE BLDC GLUCOMTR-MCNC: 84 MG/DL — SIGNIFICANT CHANGE UP (ref 70–99)

## 2022-11-04 PROCEDURE — 64774 REMOVE SKIN NERVE LESION: CPT | Mod: FA,XS

## 2022-11-04 PROCEDURE — 82962 GLUCOSE BLOOD TEST: CPT

## 2022-11-04 PROCEDURE — 64912 NRV RPR W/NRV ALGRFT 1ST: CPT | Mod: AS,LT

## 2022-11-04 PROCEDURE — C1762: CPT

## 2022-11-04 PROCEDURE — 64912 NRV RPR W/NRV ALGRFT 1ST: CPT | Mod: LT

## 2022-11-04 PROCEDURE — 99261: CPT

## 2022-11-04 PROCEDURE — 64912 NRV RPR W/NRV ALGRFT 1ST: CPT | Mod: FA

## 2022-11-04 DEVICE — IMPLANTABLE DEVICE: Type: IMPLANTABLE DEVICE | Site: LEFT | Status: FUNCTIONAL

## 2022-11-04 RX ORDER — SODIUM CHLORIDE 9 MG/ML
3 INJECTION INTRAMUSCULAR; INTRAVENOUS; SUBCUTANEOUS ONCE
Refills: 0 | Status: DISCONTINUED | OUTPATIENT
Start: 2022-11-04 | End: 2022-11-04

## 2022-11-04 RX ORDER — CELECOXIB 200 MG/1
400 CAPSULE ORAL ONCE
Refills: 0 | Status: COMPLETED | OUTPATIENT
Start: 2022-11-04 | End: 2022-11-04

## 2022-11-04 RX ORDER — APREPITANT 80 MG/1
40 CAPSULE ORAL ONCE
Refills: 0 | Status: COMPLETED | OUTPATIENT
Start: 2022-11-04 | End: 2022-11-04

## 2022-11-04 RX ORDER — OXYCODONE HYDROCHLORIDE 5 MG/1
5 TABLET ORAL EVERY 4 HOURS
Refills: 0 | Status: DISCONTINUED | OUTPATIENT
Start: 2022-11-04 | End: 2022-11-04

## 2022-11-04 RX ORDER — ONDANSETRON 8 MG/1
4 TABLET, FILM COATED ORAL EVERY 4 HOURS
Refills: 0 | Status: DISCONTINUED | OUTPATIENT
Start: 2022-11-04 | End: 2022-11-18

## 2022-11-04 RX ORDER — ACETAMINOPHEN 500 MG
975 TABLET ORAL ONCE
Refills: 0 | Status: COMPLETED | OUTPATIENT
Start: 2022-11-04 | End: 2022-11-04

## 2022-11-04 RX ORDER — ONDANSETRON 8 MG/1
4 TABLET, FILM COATED ORAL ONCE
Refills: 0 | Status: DISCONTINUED | OUTPATIENT
Start: 2022-11-04 | End: 2022-11-04

## 2022-11-04 RX ORDER — HYDROMORPHONE HYDROCHLORIDE 2 MG/ML
0.5 INJECTION INTRAMUSCULAR; INTRAVENOUS; SUBCUTANEOUS
Refills: 0 | Status: DISCONTINUED | OUTPATIENT
Start: 2022-11-04 | End: 2022-11-04

## 2022-11-04 RX ORDER — CEFAZOLIN SODIUM 1 G
2000 VIAL (EA) INJECTION ONCE
Refills: 0 | Status: DISCONTINUED | OUTPATIENT
Start: 2022-11-04 | End: 2022-11-04

## 2022-11-04 RX ORDER — CHOLECALCIFEROL (VITAMIN D3) 125 MCG
1 CAPSULE ORAL
Qty: 0 | Refills: 0 | DISCHARGE

## 2022-11-04 RX ADMIN — CELECOXIB 400 MILLIGRAM(S): 200 CAPSULE ORAL at 06:50

## 2022-11-04 RX ADMIN — APREPITANT 40 MILLIGRAM(S): 80 CAPSULE ORAL at 06:50

## 2022-11-04 RX ADMIN — Medication 975 MILLIGRAM(S): at 06:50

## 2022-11-04 NOTE — ASU DISCHARGE PLAN (ADULT/PEDIATRIC) - NS MD DC FALL RISK RISK
For information on Fall & Injury Prevention, visit: [email or web address] OR  [email or web address] OR  https://www.cdc.gov/steadi/patient.html

## 2022-11-04 NOTE — ASU DISCHARGE PLAN (ADULT/PEDIATRIC) - CARE PROVIDER_API CALL
Gorge Jefferson)  Orthopedics  [address] [place] [id number]  Phone: [phone]  Fax: [phone]  Follow Up Time:

## 2022-11-15 ENCOUNTER — APPOINTMENT (OUTPATIENT)
Dept: ORTHOPEDIC SURGERY | Facility: CLINIC | Age: 37
End: 2022-11-15

## 2022-11-15 DIAGNOSIS — R20.0 ANESTHESIA OF SKIN: ICD-10-CM

## 2022-11-15 DIAGNOSIS — M79.645 PAIN IN LEFT FINGER(S): ICD-10-CM

## 2022-11-15 DIAGNOSIS — S61.012S LACERATION W/OUT FOREIGN BODY OF LEFT THUMB W/OUT DAMAGE TO NAIL, SEQUELA: ICD-10-CM

## 2022-11-15 PROCEDURE — 99024 POSTOP FOLLOW-UP VISIT: CPT

## 2022-11-15 NOTE — HISTORY OF PRESENT ILLNESS
[de-identified] : s/p left thumb exploration of chronic laceration, neurolysis excision, and ulnar digital nerve reconstruction with allograft. [de-identified] : Ms. Nuñez is now 2 weeks from her left thumb ulnar digital nerve reconstruction with allograft.  She feels very well.  Her pain is minimal [de-identified] : She is well-appearing on examination\par There are no signs of infection and the incision is healing excellently\par Swelling is minimal and stiffness is also minimal\par  [de-identified] : She is doing excellently after her surgery of the left thumb [de-identified] : 1.  She can commence gentle range of motion. \par #2 I would like to see her back in 2 weeks time

## 2023-02-22 ENCOUNTER — APPOINTMENT (OUTPATIENT)
Dept: OBGYN | Facility: CLINIC | Age: 38
End: 2023-02-22

## 2023-03-01 ENCOUNTER — APPOINTMENT (OUTPATIENT)
Dept: OBGYN | Facility: CLINIC | Age: 38
End: 2023-03-01
Payer: COMMERCIAL

## 2023-03-01 VITALS
HEIGHT: 63 IN | DIASTOLIC BLOOD PRESSURE: 84 MMHG | SYSTOLIC BLOOD PRESSURE: 140 MMHG | WEIGHT: 253 LBS | BODY MASS INDEX: 44.83 KG/M2

## 2023-03-01 PROCEDURE — 99213 OFFICE O/P EST LOW 20 MIN: CPT

## 2023-03-01 NOTE — HISTORY OF PRESENT ILLNESS
[N] : Patient does not use contraception [Y] : Positive pregnancy history [N/A] : pregnancy not applicable [Normal Amount/Duration] :  normal amount and duration [Regular Cycle Intervals] : periods have been regular [Currently Active] : currently active [Men] : men [Vaginal] : vaginal [No] : No [FreeTextEntry1] : 38 y/o female here for possible vaginal infection.  Patient says she does have abnormal discharge which is malodorous smell to it she has had this for about 1 week and has not been treated.  She denies pruritus recent use of antibiotics or history of STDs examination reveals normal external genitalia BUS within normal limits heavy mucoid thick malodorous discharge pH 7 culture sent we will treat with MetroGel vaginal for 5 days and check for STDs patient to return for annual exam in June 2023 [TextBox_4] : PT HERE FOR POSSIBLE INFECTION\par LMP: [MensesFreq] : 28 [MensesLength] : 5 [PGHxTotal] : 3 [[facility]] : 1 [PGHxAbortions] : 1 [[facility] Center[facility]] : 1 [PGxEctopic] : 1 [TextBox_9] : 12 [TextBox_13] : 28 [TextBox_15] : 5

## 2023-03-06 LAB
A VAGINAE DNA VAG QL NAA+PROBE: ABNORMAL
BVAB2 DNA VAG QL NAA+PROBE: ABNORMAL
C KRUSEI DNA VAG QL NAA+PROBE: NEGATIVE
C TRACH RRNA SPEC QL NAA+PROBE: NEGATIVE
MEGA1 DNA VAG QL NAA+PROBE: ABNORMAL
N GONORRHOEA RRNA SPEC QL NAA+PROBE: NEGATIVE
T VAGINALIS RRNA SPEC QL NAA+PROBE: POSITIVE

## 2023-03-07 ENCOUNTER — NON-APPOINTMENT (OUTPATIENT)
Age: 38
End: 2023-03-07

## 2023-03-24 ENCOUNTER — NON-APPOINTMENT (OUTPATIENT)
Age: 38
End: 2023-03-24

## 2023-04-10 LAB — SARS-COV-2 N GENE NPH QL NAA+PROBE: NOT DETECTED

## 2023-04-28 ENCOUNTER — APPOINTMENT (OUTPATIENT)
Dept: OBGYN | Facility: CLINIC | Age: 38
End: 2023-04-28

## 2023-05-19 ENCOUNTER — APPOINTMENT (OUTPATIENT)
Dept: OBGYN | Facility: CLINIC | Age: 38
End: 2023-05-19
Payer: COMMERCIAL

## 2023-05-19 DIAGNOSIS — A59.9 TRICHOMONIASIS, UNSPECIFIED: ICD-10-CM

## 2023-05-19 PROCEDURE — 99213 OFFICE O/P EST LOW 20 MIN: CPT

## 2023-05-19 NOTE — HISTORY OF PRESENT ILLNESS
[FreeTextEntry1] : Patient with a history of BV and trichomonas she was treated with MetroGel vaginal gel plus oral metronidazole.  Patient now presents for test of cure patient states her partner was treated as well she denies abnormal discharge or bleeding at this time no fevers there are no GI or  issues.  Examination normal external genitalia BUS within normal limits vagina clear vaginitis panel with trichomonas completed.  Patient will use latex condoms and practice safe sex.  Patient to return in 12 months for annual checkup [TextBox_4] : PT PRESENT TODAY FOR A CHANTALE \par

## 2023-05-24 LAB
A VAGINAE DNA VAG QL NAA+PROBE: NORMAL
BVAB2 DNA VAG QL NAA+PROBE: NORMAL
C KRUSEI DNA VAG QL NAA+PROBE: NEGATIVE
C TRACH RRNA SPEC QL NAA+PROBE: NEGATIVE
CANDIDA DNA VAG QL NAA+PROBE: NEGATIVE
MEGA1 DNA VAG QL NAA+PROBE: NORMAL
N GONORRHOEA RRNA SPEC QL NAA+PROBE: NEGATIVE
T VAGINALIS RRNA SPEC QL NAA+PROBE: POSITIVE

## 2023-05-24 RX ORDER — METRONIDAZOLE 500 MG/1
500 TABLET ORAL
Qty: 4 | Refills: 1 | Status: ACTIVE | COMMUNITY
Start: 2022-07-08 | End: 1900-01-01

## 2023-08-15 ENCOUNTER — NON-APPOINTMENT (OUTPATIENT)
Age: 38
End: 2023-08-15

## 2023-08-16 RX ORDER — METRONIDAZOLE 7.5 MG/G
0.75 GEL VAGINAL
Qty: 1 | Refills: 0 | Status: ACTIVE | COMMUNITY
Start: 2023-03-01 | End: 1900-01-01

## 2024-04-23 ENCOUNTER — APPOINTMENT (OUTPATIENT)
Dept: OBGYN | Facility: CLINIC | Age: 39
End: 2024-04-23

## 2024-07-02 ENCOUNTER — APPOINTMENT (OUTPATIENT)
Dept: OBGYN | Facility: CLINIC | Age: 39
End: 2024-07-02

## 2024-07-02 DIAGNOSIS — N89.8 OTHER SPECIFIED NONINFLAMMATORY DISORDERS OF VAGINA: ICD-10-CM

## 2024-07-02 DIAGNOSIS — Z11.51 ENCOUNTER FOR SCREENING FOR HUMAN PAPILLOMAVIRUS (HPV): ICD-10-CM

## 2024-07-02 DIAGNOSIS — R39.9 UNSPECIFIED SYMPTOMS AND SIGNS INVOLVING THE GENITOURINARY SYSTEM: ICD-10-CM

## 2024-07-02 DIAGNOSIS — Z11.3 ENCOUNTER FOR SCREENING FOR INFECTIONS WITH A PREDOMINANTLY SEXUAL MODE OF TRANSMISSION: ICD-10-CM

## 2024-07-31 ENCOUNTER — APPOINTMENT (OUTPATIENT)
Dept: OBGYN | Facility: CLINIC | Age: 39
End: 2024-07-31
Payer: COMMERCIAL

## 2024-07-31 VITALS
BODY MASS INDEX: 44.3 KG/M2 | DIASTOLIC BLOOD PRESSURE: 97 MMHG | HEIGHT: 63 IN | WEIGHT: 250 LBS | HEART RATE: 79 BPM | SYSTOLIC BLOOD PRESSURE: 148 MMHG

## 2024-07-31 DIAGNOSIS — Z11.51 ENCOUNTER FOR SCREENING FOR HUMAN PAPILLOMAVIRUS (HPV): ICD-10-CM

## 2024-07-31 DIAGNOSIS — Z97.5 PRESENCE OF (INTRAUTERINE) CONTRACEPTIVE DEVICE: ICD-10-CM

## 2024-07-31 DIAGNOSIS — E66.01 MORBID (SEVERE) OBESITY DUE TO EXCESS CALORIES: ICD-10-CM

## 2024-07-31 DIAGNOSIS — Z01.419 ENCOUNTER FOR GYNECOLOGICAL EXAMINATION (GENERAL) (ROUTINE) W/OUT ABNORMAL FINDINGS: ICD-10-CM

## 2024-07-31 PROCEDURE — 99395 PREV VISIT EST AGE 18-39: CPT

## 2024-07-31 PROCEDURE — 99459 PELVIC EXAMINATION: CPT

## 2024-07-31 NOTE — HISTORY OF PRESENT ILLNESS
[HIV test declined] : HIV test declined [Syphilis test declined] : Syphilis test declined [Gonorrhea test declined] : Gonorrhea test declined [Chlamydia test declined] : Chlamydia test declined [Trichomonas test declined] : Trichomonas test declined [HPV test declined] : HPV test declined [Hepatitis B test declined] : Hepatitis B test declined [Hepatitis C test declined] : Hepatitis C test declined [N] : Patient does not use contraception [Y] : Positive pregnancy history [Currently Active] : currently active [Men] : men [Vaginal] : vaginal [No] : No [TextBox_4] : PT HERE FOR ANNUAL EXAM LMP: 7/8/24 [Mammogramdate] : 5/1/21 [TextBox_19] : BR1 [PapSmeardate] : 6/28/22 [TextBox_31] : WNL [LMPDate] : 7/8/24 [PGHxTotal] : 3 [[facility] CenterxFulerm] : 1 [[facility]] : 1 [PGHxABInduced] : 1 [PGxEctopic] : 1 [TextBox_6] : 7/8/24 [FreeTextEntry1] : 7/8/24

## 2024-07-31 NOTE — PHYSICAL EXAM
[Chaperone Present] : A chaperone was present in the examining room during all aspects of the physical examination [[id number]] : A chaperone was present during the pelvic exam. [Appropriately responsive] : appropriately responsive [Alert] : alert [No Acute Distress] : no acute distress [No Lymphadenopathy] : no lymphadenopathy [Regular Rate Rhythm] : regular rate rhythm [No Murmurs] : no murmurs [Clear to Auscultation B/L] : clear to auscultation bilaterally [Soft] : soft [Non-tender] : non-tender [Non-distended] : non-distended [No HSM] : No HSM [No Lesions] : no lesions [No Mass] : no mass [Oriented x3] : oriented x3 [Examination Of The Breasts] : a normal appearance [No Masses] : no breast masses were palpable [Labia Majora] : normal [Labia Minora] : normal [Normal] : normal [Uterine Adnexae] : normal [Declined] : Patient declined rectal exam [FreeTextEntry2] : KF [FreeTextEntry6] : Pendulous, symmetrical, no masses nontender [FreeTextEntry8] : No masses nontender

## 2024-07-31 NOTE — PLAN
[FreeTextEntry1] : Pap smear completed STD workup ordered patient with IUD in place, continue vitamin D3 multivitamin safe sex use of latex condoms discussed patient to return in 12 months

## 2024-08-02 LAB
C TRACH RRNA SPEC QL NAA+PROBE: NOT DETECTED
HPV HIGH+LOW RISK DNA PNL CVX: NOT DETECTED
N GONORRHOEA RRNA SPEC QL NAA+PROBE: NOT DETECTED
SOURCE TP AMPLIFICATION: NORMAL

## 2024-08-07 PROBLEM — Z86.19 HISTORY OF TRICHOMONIASIS: Status: RESOLVED | Noted: 2022-07-08 | Resolved: 2024-08-07

## 2024-08-07 PROBLEM — N76.0 ACUTE VAGINITIS: Status: ACTIVE | Noted: 2022-02-22

## 2024-08-09 LAB
HBV SURFACE AG SER QL: NONREACTIVE
HCV AB SER QL: NONREACTIVE
HCV S/CO RATIO: 0.11 S/CO
HIV1+2 AB SPEC QL IA.RAPID: NONREACTIVE
HSV1-DNA: NOT DETECTED
HSV2-DNA: NOT DETECTED
RPR SER-TITR: NORMAL

## 2024-11-25 ENCOUNTER — EMERGENCY (EMERGENCY)
Facility: HOSPITAL | Age: 39
LOS: 1 days | Discharge: HOME | End: 2024-11-25
Attending: EMERGENCY MEDICINE
Payer: COMMERCIAL

## 2024-11-25 VITALS
DIASTOLIC BLOOD PRESSURE: 78 MMHG | HEART RATE: 85 BPM | RESPIRATION RATE: 18 BRPM | OXYGEN SATURATION: 99 % | HEIGHT: 63 IN | SYSTOLIC BLOOD PRESSURE: 158 MMHG | TEMPERATURE: 98 F | WEIGHT: 251.33 LBS

## 2024-11-25 DIAGNOSIS — Z98.890 OTHER SPECIFIED POSTPROCEDURAL STATES: Chronic | ICD-10-CM

## 2024-11-25 DIAGNOSIS — D25.9 LEIOMYOMA OF UTERUS, UNSPECIFIED: Chronic | ICD-10-CM

## 2024-11-25 DIAGNOSIS — Z98.84 BARIATRIC SURGERY STATUS: Chronic | ICD-10-CM

## 2024-11-25 DIAGNOSIS — Z98.89 OTHER SPECIFIED POSTPROCEDURAL STATES: Chronic | ICD-10-CM

## 2024-11-25 PROCEDURE — 73502 X-RAY EXAM HIP UNI 2-3 VIEWS: CPT

## 2024-11-25 PROCEDURE — 99284 EMERGENCY DEPT VISIT MOD MDM: CPT

## 2024-11-25 PROCEDURE — 99283 EMERGENCY DEPT VISIT LOW MDM: CPT

## 2024-11-25 PROCEDURE — 73502 X-RAY EXAM HIP UNI 2-3 VIEWS: CPT | Mod: 26,LT

## 2024-11-25 RX ORDER — ACETAMINOPHEN 500MG 500 MG/1
650 TABLET, COATED ORAL ONCE
Refills: 0 | Status: COMPLETED | OUTPATIENT
Start: 2024-11-25 | End: 2024-11-25

## 2024-11-25 RX ADMIN — ACETAMINOPHEN 500MG 650 MILLIGRAM(S): 500 TABLET, COATED ORAL at 18:46

## 2024-11-25 NOTE — ED PROVIDER NOTE - ATTENDING APP SHARED VISIT CONTRIBUTION OF CARE
Zhang: I performed a face to face bedside interview with patient regarding history of present illness, review of symptoms and past medical history. I completed an independent physical exam.  I have discussed patient's plan of care with advanced care provider.   I agree with note as stated above including HISTORY OF PRESENT ILLNESS, HIV, PAST MEDICAL/SURGICAL/FAMILY/SOCIAL HISTORY, ALLERGIES AND HOME MEDICATIONS, REVIEW OF SYSTEMS, PHYSICAL EXAM, MEDICAL DECISION MAKING and any PROGRESS NOTES during the time I functioned as the attending physician for this patient  unless otherwise noted. My brief assessment is as follows: no pmh p/w left hip pain s/p slip and fall onto hip yesterday. ambulatory since. no head injury, no vomiting, no a/c. no midline back pain, no other complaints. non toxic, ncat, no midline ttp, ctab, rrr, abd benign, no bruising. neuro intact. ttp to lateral left hip, can range. xray, supportive care
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
## 2024-11-25 NOTE — ED PROVIDER NOTE - PATIENT PORTAL LINK FT
You can access the FollowMyHealth Patient Portal offered by [facility] by registering at the following website: [email or web address]. By joining [employer]’s FollowMyHealth portal, you will also be able to view your health information using other applications (apps) compatible with our system.

## 2024-11-25 NOTE — ED PROVIDER NOTE - OBJECTIVE STATEMENT
40 y/o F c/o pain in left hip since yesterday when she slipped and fell, landing on her left hip.  Patient is able to walk.  Denies numbness/tingling, saddle anesthesia, bowel/bladder incontinence.

## 2025-03-11 ENCOUNTER — APPOINTMENT (OUTPATIENT)
Dept: OBGYN | Facility: CLINIC | Age: 40
End: 2025-03-11
Payer: COMMERCIAL

## 2025-03-11 VITALS
HEIGHT: 63 IN | HEART RATE: 109 BPM | BODY MASS INDEX: 44.3 KG/M2 | SYSTOLIC BLOOD PRESSURE: 154 MMHG | WEIGHT: 250 LBS | DIASTOLIC BLOOD PRESSURE: 93 MMHG

## 2025-03-11 DIAGNOSIS — N76.0 ACUTE VAGINITIS: ICD-10-CM

## 2025-03-11 DIAGNOSIS — N76.3 SUBACUTE AND CHRONIC VULVITIS: ICD-10-CM

## 2025-03-11 DIAGNOSIS — N89.8 OTHER SPECIFIED NONINFLAMMATORY DISORDERS OF VAGINA: ICD-10-CM

## 2025-03-11 DIAGNOSIS — Z97.5 PRESENCE OF (INTRAUTERINE) CONTRACEPTIVE DEVICE: ICD-10-CM

## 2025-03-11 DIAGNOSIS — N31.8 OTHER NEUROMUSCULAR DYSFUNCTION OF BLADDER: ICD-10-CM

## 2025-03-11 DIAGNOSIS — N90.60 UNSPECIFIED HYPERTROPHY OF VULVA: ICD-10-CM

## 2025-03-11 PROCEDURE — 99214 OFFICE O/P EST MOD 30 MIN: CPT

## 2025-03-11 RX ORDER — CLINDAMYCIN PHOSPHATE 20 MG/G
2 CREAM VAGINAL
Qty: 1 | Refills: 0 | Status: ACTIVE | COMMUNITY
Start: 2025-03-11 | End: 1900-01-01

## 2025-03-18 ENCOUNTER — APPOINTMENT (OUTPATIENT)
Dept: OBGYN | Facility: CLINIC | Age: 40
End: 2025-03-18
Payer: COMMERCIAL

## 2025-03-18 DIAGNOSIS — N32.81 OVERACTIVE BLADDER: ICD-10-CM

## 2025-03-18 PROCEDURE — 51729 CYSTOMETROGRAM W/VP&UP: CPT

## 2025-03-18 PROCEDURE — 51741 ELECTRO-UROFLOWMETRY FIRST: CPT

## 2025-03-18 PROCEDURE — 51797 INTRAABDOMINAL PRESSURE TEST: CPT

## 2025-03-18 PROCEDURE — 51784 ANAL/URINARY MUSCLE STUDY: CPT

## 2025-03-19 LAB
APPEARANCE: CLEAR
BILIRUBIN URINE: NEGATIVE
BLOOD URINE: NEGATIVE
COLOR: YELLOW
GLUCOSE QUALITATIVE U: NEGATIVE MG/DL
KETONES URINE: NEGATIVE MG/DL
LEUKOCYTE ESTERASE URINE: NEGATIVE
NITRITE URINE: NEGATIVE
PH URINE: 7
PROTEIN URINE: NEGATIVE MG/DL
SPECIFIC GRAVITY URINE: <1.005
UROBILINOGEN URINE: 0.2 MG/DL

## 2025-04-11 ENCOUNTER — APPOINTMENT (OUTPATIENT)
Dept: OBGYN | Facility: CLINIC | Age: 40
End: 2025-04-11
Payer: COMMERCIAL

## 2025-04-11 ENCOUNTER — ASOB RESULT (OUTPATIENT)
Age: 40
End: 2025-04-11

## 2025-04-11 DIAGNOSIS — N90.60 UNSPECIFIED HYPERTROPHY OF VULVA: ICD-10-CM

## 2025-04-11 DIAGNOSIS — Z97.5 PRESENCE OF (INTRAUTERINE) CONTRACEPTIVE DEVICE: ICD-10-CM

## 2025-04-11 DIAGNOSIS — N31.8 OTHER NEUROMUSCULAR DYSFUNCTION OF BLADDER: ICD-10-CM

## 2025-04-11 PROCEDURE — 76830 TRANSVAGINAL US NON-OB: CPT

## 2025-04-11 PROCEDURE — 76856 US EXAM PELVIC COMPLETE: CPT | Mod: 59

## 2025-04-11 PROCEDURE — 93975 VASCULAR STUDY: CPT

## 2025-04-11 PROCEDURE — 99213 OFFICE O/P EST LOW 20 MIN: CPT

## 2025-05-13 ENCOUNTER — APPOINTMENT (OUTPATIENT)
Dept: OBGYN | Facility: CLINIC | Age: 40
End: 2025-05-13

## 2025-07-30 ENCOUNTER — NON-APPOINTMENT (OUTPATIENT)
Age: 40
End: 2025-07-30

## 2025-09-02 ENCOUNTER — NON-APPOINTMENT (OUTPATIENT)
Age: 40
End: 2025-09-02

## 2025-09-02 ENCOUNTER — APPOINTMENT (OUTPATIENT)
Dept: OBGYN | Facility: CLINIC | Age: 40
End: 2025-09-02
Payer: COMMERCIAL

## 2025-09-02 VITALS
HEART RATE: 87 BPM | HEIGHT: 63 IN | DIASTOLIC BLOOD PRESSURE: 104 MMHG | BODY MASS INDEX: 46.07 KG/M2 | SYSTOLIC BLOOD PRESSURE: 159 MMHG | WEIGHT: 260 LBS

## 2025-09-02 DIAGNOSIS — N76.4 ABSCESS OF VULVA: ICD-10-CM

## 2025-09-02 PROCEDURE — 99213 OFFICE O/P EST LOW 20 MIN: CPT

## 2025-09-02 RX ORDER — SULFAMETHOXAZOLE AND TRIMETHOPRIM 800; 160 MG/1; MG/1
800-160 TABLET ORAL TWICE DAILY
Qty: 14 | Refills: 0 | Status: ACTIVE | COMMUNITY
Start: 2025-09-02 | End: 1900-01-01

## 2025-09-09 ENCOUNTER — APPOINTMENT (OUTPATIENT)
Dept: OBGYN | Facility: CLINIC | Age: 40
End: 2025-09-09

## (undated) DEVICE — DRAPE HAND 77" X 146"

## (undated) DEVICE — SOL IRR POUR H2O 1000ML

## (undated) DEVICE — DRSG DERMABOND PRINEO 60CM

## (undated) DEVICE — SOL IRR POUR NS 0.9% 1000ML

## (undated) DEVICE — WARMING BLANKET LOWER ADULT

## (undated) DEVICE — GLV 8 PROTEXIS (WHITE)

## (undated) DEVICE — NDL HYPO REGULAR BEVEL 25G X 1.5" (BLUE)

## (undated) DEVICE — TOURNIQUET ESMARK 4"

## (undated) DEVICE — VENODYNE/SCD SLEEVE CALF MEDIUM

## (undated) DEVICE — ELCTR BOVIE TIP NEEDLE IMA/ENT 3/4"

## (undated) DEVICE — BIPOLAR FORCEP KIRWAN JEWELERS STR 4" X 0.4MM W 12FT CORD (GREEN)

## (undated) DEVICE — PACK EXTREMITY

## (undated) DEVICE — IMMOBILIZER HAND ALUMINUM LARGE